# Patient Record
Sex: FEMALE | Race: BLACK OR AFRICAN AMERICAN | NOT HISPANIC OR LATINO
[De-identification: names, ages, dates, MRNs, and addresses within clinical notes are randomized per-mention and may not be internally consistent; named-entity substitution may affect disease eponyms.]

---

## 2017-09-19 PROBLEM — Z00.00 ENCOUNTER FOR PREVENTIVE HEALTH EXAMINATION: Status: ACTIVE | Noted: 2017-09-19

## 2017-09-28 ENCOUNTER — APPOINTMENT (OUTPATIENT)
Dept: ORTHOPEDIC SURGERY | Facility: CLINIC | Age: 62
End: 2017-09-28
Payer: COMMERCIAL

## 2017-09-28 VITALS
HEART RATE: 118 BPM | WEIGHT: 158 LBS | BODY MASS INDEX: 31.02 KG/M2 | SYSTOLIC BLOOD PRESSURE: 136 MMHG | DIASTOLIC BLOOD PRESSURE: 81 MMHG | HEIGHT: 60 IN

## 2017-09-28 PROCEDURE — 99214 OFFICE O/P EST MOD 30 MIN: CPT

## 2017-09-28 PROCEDURE — 73564 X-RAY EXAM KNEE 4 OR MORE: CPT | Mod: LT

## 2017-11-02 ENCOUNTER — APPOINTMENT (OUTPATIENT)
Dept: ORTHOPEDIC SURGERY | Facility: CLINIC | Age: 62
End: 2017-11-02
Payer: COMMERCIAL

## 2017-11-02 VITALS
WEIGHT: 158 LBS | SYSTOLIC BLOOD PRESSURE: 121 MMHG | BODY MASS INDEX: 31.02 KG/M2 | DIASTOLIC BLOOD PRESSURE: 81 MMHG | HEIGHT: 60 IN | HEART RATE: 123 BPM

## 2017-11-02 PROCEDURE — 99214 OFFICE O/P EST MOD 30 MIN: CPT

## 2018-01-15 ENCOUNTER — OUTPATIENT (OUTPATIENT)
Dept: OUTPATIENT SERVICES | Facility: HOSPITAL | Age: 63
LOS: 1 days | End: 2018-01-15
Payer: COMMERCIAL

## 2018-01-15 DIAGNOSIS — Z22.321 CARRIER OR SUSPECTED CARRIER OF METHICILLIN SUSCEPTIBLE STAPHYLOCOCCUS AUREUS: ICD-10-CM

## 2018-01-15 LAB
MRSA PCR RESULT.: NEGATIVE — SIGNIFICANT CHANGE UP
S AUREUS DNA NOSE QL NAA+PROBE: POSITIVE

## 2018-01-15 PROCEDURE — 87641 MR-STAPH DNA AMP PROBE: CPT

## 2018-01-16 RX ORDER — MUPIROCIN 20 MG/G
1 OINTMENT TOPICAL
Qty: 1 | Refills: 0
Start: 2018-01-16 | End: 2018-01-20

## 2018-01-25 ENCOUNTER — FORM ENCOUNTER (OUTPATIENT)
Age: 63
End: 2018-01-25

## 2018-01-25 VITALS
SYSTOLIC BLOOD PRESSURE: 152 MMHG | TEMPERATURE: 98 F | DIASTOLIC BLOOD PRESSURE: 70 MMHG | OXYGEN SATURATION: 98 % | HEIGHT: 59 IN | WEIGHT: 164.24 LBS | RESPIRATION RATE: 20 BRPM

## 2018-01-25 NOTE — PATIENT PROFILE ADULT. - PSH
H/O breast surgery  L  H/O colonoscopy  9/15  S/P left knee arthroscopy H/O breast surgery  L  H/O colonoscopy  9/15  S/P left knee arthroscopy  2002, 2007 Fallopian tube pregnancy  right  H/O breast surgery  L  H/O colonoscopy  9/15  H/O wisdom tooth extraction    History of arthroscopic knee surgery  bilateral at different times

## 2018-01-25 NOTE — PATIENT PROFILE ADULT. - PMH
Anal fissure    Anal fistula    Anxiety    HTN (hypertension)    Obesity    Osteoarthritis  b/l  Ovarian cyst Anal fissure    Anal fistula    Anxiety    HTN (hypertension)    Obesity    Osteoarthritis  b/l knee  Ovarian cyst

## 2018-01-26 ENCOUNTER — INPATIENT (INPATIENT)
Facility: HOSPITAL | Age: 63
LOS: 2 days | Discharge: HOME HEALTH | DRG: 470 | End: 2018-01-29
Attending: ORTHOPAEDIC SURGERY | Admitting: ORTHOPAEDIC SURGERY
Payer: COMMERCIAL

## 2018-01-26 ENCOUNTER — RESULT REVIEW (OUTPATIENT)
Age: 63
End: 2018-01-26

## 2018-01-26 ENCOUNTER — APPOINTMENT (OUTPATIENT)
Dept: ORTHOPEDIC SURGERY | Facility: HOSPITAL | Age: 63
End: 2018-01-26
Payer: COMMERCIAL

## 2018-01-26 DIAGNOSIS — Z98.890 OTHER SPECIFIED POSTPROCEDURAL STATES: Chronic | ICD-10-CM

## 2018-01-26 DIAGNOSIS — F41.9 ANXIETY DISORDER, UNSPECIFIED: ICD-10-CM

## 2018-01-26 DIAGNOSIS — O00.109 UNSPECIFIED TUBAL PREGNANCY WITHOUT INTRAUTERINE PREGNANCY: Chronic | ICD-10-CM

## 2018-01-26 DIAGNOSIS — K08.499 PARTIAL LOSS OF TEETH DUE TO OTHER SPECIFIED CAUSE, UNSPECIFIED CLASS: Chronic | ICD-10-CM

## 2018-01-26 DIAGNOSIS — I10 ESSENTIAL (PRIMARY) HYPERTENSION: ICD-10-CM

## 2018-01-26 DIAGNOSIS — M17.12 UNILATERAL PRIMARY OSTEOARTHRITIS, LEFT KNEE: ICD-10-CM

## 2018-01-26 DIAGNOSIS — Z90.79 ACQUIRED ABSENCE OF OTHER GENITAL ORGAN(S): Chronic | ICD-10-CM

## 2018-01-26 DIAGNOSIS — E66.01 MORBID (SEVERE) OBESITY DUE TO EXCESS CALORIES: ICD-10-CM

## 2018-01-26 LAB
HCT VFR BLD CALC: 34.6 % — SIGNIFICANT CHANGE UP (ref 34.5–45)
HGB BLD-MCNC: 10.8 G/DL — LOW (ref 11.5–15.5)
MCHC RBC-ENTMCNC: 26.2 PG — LOW (ref 27–34)
MCHC RBC-ENTMCNC: 31.2 G/DL — LOW (ref 32–36)
MCV RBC AUTO: 84 FL — SIGNIFICANT CHANGE UP (ref 80–100)
PLATELET # BLD AUTO: 250 K/UL — SIGNIFICANT CHANGE UP (ref 150–400)
RBC # BLD: 4.12 M/UL — SIGNIFICANT CHANGE UP (ref 3.8–5.2)
RBC # FLD: 13.1 % — SIGNIFICANT CHANGE UP (ref 10.3–16.9)
WBC # BLD: 3.7 K/UL — LOW (ref 3.8–10.5)
WBC # FLD AUTO: 3.7 K/UL — LOW (ref 3.8–10.5)

## 2018-01-26 PROCEDURE — 27447 TOTAL KNEE ARTHROPLASTY: CPT | Mod: LT

## 2018-01-26 PROCEDURE — ZZZZZ: CPT

## 2018-01-26 PROCEDURE — 73560 X-RAY EXAM OF KNEE 1 OR 2: CPT | Mod: 26,LT

## 2018-01-26 PROCEDURE — 20985 CPTR-ASST DIR MS PX: CPT

## 2018-01-26 RX ORDER — DOCUSATE SODIUM 100 MG
100 CAPSULE ORAL THREE TIMES A DAY
Refills: 0 | Status: DISCONTINUED | OUTPATIENT
Start: 2018-01-26 | End: 2018-01-29

## 2018-01-26 RX ORDER — HYDROMORPHONE HYDROCHLORIDE 2 MG/ML
4 INJECTION INTRAMUSCULAR; INTRAVENOUS; SUBCUTANEOUS EVERY 4 HOURS
Refills: 0 | Status: DISCONTINUED | OUTPATIENT
Start: 2018-01-26 | End: 2018-01-29

## 2018-01-26 RX ORDER — SENNA PLUS 8.6 MG/1
2 TABLET ORAL AT BEDTIME
Refills: 0 | Status: DISCONTINUED | OUTPATIENT
Start: 2018-01-26 | End: 2018-01-29

## 2018-01-26 RX ORDER — HYDROMORPHONE HYDROCHLORIDE 2 MG/ML
0.5 INJECTION INTRAMUSCULAR; INTRAVENOUS; SUBCUTANEOUS
Refills: 0 | Status: DISCONTINUED | OUTPATIENT
Start: 2018-01-26 | End: 2018-01-29

## 2018-01-26 RX ORDER — HYDROMORPHONE HYDROCHLORIDE 2 MG/ML
2 INJECTION INTRAMUSCULAR; INTRAVENOUS; SUBCUTANEOUS EVERY 4 HOURS
Refills: 0 | Status: DISCONTINUED | OUTPATIENT
Start: 2018-01-26 | End: 2018-01-29

## 2018-01-26 RX ORDER — OXYCODONE HYDROCHLORIDE 5 MG/1
20 TABLET ORAL ONCE
Refills: 0 | Status: DISCONTINUED | OUTPATIENT
Start: 2018-01-26 | End: 2018-01-26

## 2018-01-26 RX ORDER — ONDANSETRON 8 MG/1
4 TABLET, FILM COATED ORAL EVERY 6 HOURS
Refills: 0 | Status: DISCONTINUED | OUTPATIENT
Start: 2018-01-26 | End: 2018-01-29

## 2018-01-26 RX ORDER — PANTOPRAZOLE SODIUM 20 MG/1
40 TABLET, DELAYED RELEASE ORAL DAILY
Refills: 0 | Status: DISCONTINUED | OUTPATIENT
Start: 2018-01-26 | End: 2018-01-29

## 2018-01-26 RX ORDER — ACETAMINOPHEN 500 MG
975 TABLET ORAL EVERY 8 HOURS
Refills: 0 | Status: DISCONTINUED | OUTPATIENT
Start: 2018-01-26 | End: 2018-01-29

## 2018-01-26 RX ORDER — LISINOPRIL 2.5 MG/1
20 TABLET ORAL DAILY
Refills: 0 | Status: DISCONTINUED | OUTPATIENT
Start: 2018-01-26 | End: 2018-01-29

## 2018-01-26 RX ORDER — CEFAZOLIN SODIUM 1 G
2000 VIAL (EA) INJECTION EVERY 8 HOURS
Refills: 0 | Status: DISCONTINUED | OUTPATIENT
Start: 2018-01-26 | End: 2018-01-27

## 2018-01-26 RX ORDER — CELECOXIB 200 MG/1
400 CAPSULE ORAL ONCE
Refills: 0 | Status: COMPLETED | OUTPATIENT
Start: 2018-01-26 | End: 2018-01-26

## 2018-01-26 RX ORDER — KETOROLAC TROMETHAMINE 30 MG/ML
15 SYRINGE (ML) INJECTION EVERY 6 HOURS
Refills: 0 | Status: DISCONTINUED | OUTPATIENT
Start: 2018-01-26 | End: 2018-01-27

## 2018-01-26 RX ORDER — SODIUM CHLORIDE 9 MG/ML
1000 INJECTION, SOLUTION INTRAVENOUS
Refills: 0 | Status: DISCONTINUED | OUTPATIENT
Start: 2018-01-26 | End: 2018-01-29

## 2018-01-26 RX ORDER — POLYETHYLENE GLYCOL 3350 17 G/17G
17 POWDER, FOR SOLUTION ORAL DAILY
Refills: 0 | Status: DISCONTINUED | OUTPATIENT
Start: 2018-01-26 | End: 2018-01-29

## 2018-01-26 RX ORDER — HYDROCHLOROTHIAZIDE 25 MG
12.5 TABLET ORAL DAILY
Refills: 0 | Status: DISCONTINUED | OUTPATIENT
Start: 2018-01-26 | End: 2018-01-29

## 2018-01-26 RX ORDER — CEFAZOLIN SODIUM 1 G
2000 VIAL (EA) INJECTION EVERY 8 HOURS
Refills: 0 | Status: DISCONTINUED | OUTPATIENT
Start: 2018-01-26 | End: 2018-01-26

## 2018-01-26 RX ORDER — ASPIRIN/CALCIUM CARB/MAGNESIUM 324 MG
325 TABLET ORAL
Refills: 0 | Status: DISCONTINUED | OUTPATIENT
Start: 2018-01-26 | End: 2018-01-29

## 2018-01-26 RX ORDER — MAGNESIUM HYDROXIDE 400 MG/1
30 TABLET, CHEWABLE ORAL DAILY
Refills: 0 | Status: DISCONTINUED | OUTPATIENT
Start: 2018-01-26 | End: 2018-01-29

## 2018-01-26 RX ADMIN — Medication 2000 MILLIGRAM(S): at 23:17

## 2018-01-26 RX ADMIN — Medication 975 MILLIGRAM(S): at 23:17

## 2018-01-26 RX ADMIN — CELECOXIB 400 MILLIGRAM(S): 200 CAPSULE ORAL at 14:28

## 2018-01-26 RX ADMIN — Medication 25 MILLIGRAM(S): at 23:17

## 2018-01-26 RX ADMIN — HYDROMORPHONE HYDROCHLORIDE 0.5 MILLIGRAM(S): 2 INJECTION INTRAMUSCULAR; INTRAVENOUS; SUBCUTANEOUS at 23:32

## 2018-01-26 RX ADMIN — SODIUM CHLORIDE 80 MILLILITER(S): 9 INJECTION, SOLUTION INTRAVENOUS at 23:31

## 2018-01-26 RX ADMIN — Medication 15 MILLIGRAM(S): at 23:17

## 2018-01-26 RX ADMIN — Medication 100 MILLIGRAM(S): at 23:17

## 2018-01-26 RX ADMIN — OXYCODONE HYDROCHLORIDE 20 MILLIGRAM(S): 5 TABLET ORAL at 14:28

## 2018-01-26 RX ADMIN — HYDROMORPHONE HYDROCHLORIDE 0.5 MILLIGRAM(S): 2 INJECTION INTRAMUSCULAR; INTRAVENOUS; SUBCUTANEOUS at 20:58

## 2018-01-26 RX ADMIN — Medication 325 MILLIGRAM(S): at 23:36

## 2018-01-26 NOTE — H&P ADULT - HISTORY OF PRESENT ILLNESS
63 yo female here for chronic left knee pain worsened over time without improvement. Denies numbness, tingling. Ambulates without assist. Presents today for elective left TKA

## 2018-01-26 NOTE — BRIEF OPERATIVE NOTE - PROCEDURE
<<-----Click on this checkbox to enter Procedure Left total knee arthroplasty  01/26/2018    Active  JOSE

## 2018-01-26 NOTE — CONSULT NOTE ADULT - SUBJECTIVE AND OBJECTIVE BOX
HPI:  62 year old female with osteoarthritis left knee with moderate left knee pain and deformity, decreased ROM and unsteady gait.  X rays confirm diagnosis.  Symptoms worse with stairs and after prolonged imobility and persist over time    PAST MEDICAL & SURGICAL HISTORY:  Osteoarthritis: b/l knee  Anxiety  HTN (hypertension) allergic rhinitis  Obesity  Ovarian cyst  Anal fistula  Anal fissure  H/O wisdom tooth extraction  Fallopian tube pregnancy: right  History of arthroscopic knee surgery: bilateral at different times  H/O colonoscopy: 9/15  H/O breast surgery: L      REVIEW OF SYSTEMS    General:  malaise	  Skin/Breast: normal  Ophthalmologic: negative  ENMT:	normal  Respiratory and Thorax: normal  Cardiovascular:	normal  Gastrointestinal:	normal  Genitourinary:	normal  Musculoskeletal:	 left knee swelling   Neurological:	normal  Psychiatric:	normal  Hematology/Lymphatics:	 negative  Endocrine:	negative  Allergic/Immunologic:	negative      MEDICATIONS     mupirocin 2% topical ointment: Last Dose Taken: 25-Jan-2018 PM, Apply topically to both nostrils 2 times a day for 5 days   · 	lisinopril-hydroCHLOROthiazide 20 mg-12.5 mg oral tablet: Last Dose Taken: 26-Jan-2018 AM, 1 tab(s) orally once a day  · 	LORazepam 0.5 mg oral tablet: Last Dose Taken: 2017 , orally 2 times a day, As Needed  · 	diclofenac sodium 75 mg oral delayed release tablet: Last Dose Taken: 19-Jan-2018 , 1 tab(s) orally 2 times a day      Allergies    Bananas (Eye Irritation; Vomiting)  [place] (Eye Irritation; Vomiting)  No Known Drug Allergies    SOCIAL HISTORY: no cigs social alcohol    FAMILY HISTORY: non contributory      PHYSICAL EXAM:    Vital Signs Last 24 Hrs  T(C): --  T(F): --98.6  HR: --72  BP: --120/80  BP(mean): --  RR: --16  SpO2: --    Constitutional: WDWNF in NAD  Eyes: conj pale  ENMT: negative  Neck: supple  Breasts: not examined   Back: negative  Respiratory: clear to P&A  Cardiovascular: no MRGT or H  Gastrointestinal: normal bowel sounds  Genitourinary: neg  Rectal: not examined  Extremities: normal  Vascular: normal  Neurological: normal  Skin: negative  Lymph Nodes: negative  Musculoskeletal:   decreased ROM  left knee  Psychiatric: anxiety

## 2018-01-26 NOTE — CONSULT NOTE ADULT - PROBLEM SELECTOR PROBLEM 3
Class 3 severe obesity due to excess calories without serious comorbidity with body mass index (BMI) of 60.0 to 69.9 in adult

## 2018-01-26 NOTE — H&P ADULT - PSH
Fallopian tube pregnancy  right  H/O breast surgery  L  H/O colonoscopy  9/15  H/O wisdom tooth extraction    History of arthroscopic knee surgery  bilateral at different times

## 2018-01-26 NOTE — CONSULT NOTE ADULT - SUBJECTIVE AND OBJECTIVE BOX
Pain Management Consult Note - [facility] [phone]    Chief Complaint:  left knee pain    HPI:  61 y/o female with worsening left knee pain going for a left total knee replacement today.  Pt. states she was taking tylenol prn at home for pain.  States she had oxycodone after a previous surgery and stated it didn't help control the pain.        Pain is _x__ sharp ____dull ___burning ___achy ___ Intensity: ____ mild ___mod ___severe     Location ____surgical site ____cervical _____lumbar ____abd ____upper ext___x_lower ext    Worse with __x__activity _x___movement _____physical therapy___ Rest    Improved with __x__medication __x__rest ____physical therapy    ROS: Const:  _-__febrile   Eyes:___ENT:_-__CV: _-__chest pain  Resp: _-___sob  GI:_-__nausea _-__vomiting _-__abd pain _+__npo ___clears __full diet __bm  :_-__ Musk: _+__pain ___spasm  Skin:___ Neuro:  _-__zvutyyic_-__bnxurszbm__-_ numbness _-__weakness ___paresth  Psych:__anxiety  Endo:__-_ Heme:__-_Allergy:_NKDA________, ___all others reviewed and negative    PAST MEDICAL & SURGICAL HISTORY:  Osteoarthritis: b/l knee  Anxiety  HTN (hypertension)  Obesity  Ovarian cyst  Anal fistula  Anal fissure  H/O wisdom tooth extraction  Fallopian tube pregnancy: right  History of arthroscopic knee surgery: bilateral at different times  H/O colonoscopy: 9/15  H/O breast surgery: L      SH: _denies__Tobacco   _denies__Alcohol                          FH:FAMILY HISTORY:  father-HTN          T(C): --  HR: --  BP: --  RR: --  SpO2: --  Wt(kg): --    T(C): --  HR: --  BP: --  RR: --  SpO2: --  Wt(kg): --    T(C): --  HR: --  BP: --  RR: --  SpO2: --  Wt(kg): --    PHYSICAL EXAM:  Gen Appearance: _x__no acute distress _x__appropriate        Neuro: _x__SILT feet__x__ EOM Intact Psych: AAOX3__, _x__mood/affect appropriate        Eyes: _x__conjunctiva WNL  ___x__ Pupils equal and round        ENT: _x__ears and nose atraumatic__x_ Hearing grossly intact        Neck: ___trachea midline, no visible masses ___thyroid without palpable mass    Resp: __x_Nml WOB____No tactile fremitus ___clear to auscultation    Cardio: ___extremities free from edema ____pedal pulses palpable    GI/Abdomen: __x_soft ___x__ Nontender______Nondistended_____HSM    Lymphatic: ___no palpable nodes in neck  ___no palpable nodes calves and feet    Skin/Wound: ___Incision, ___Dressing c/d/i,   ____surrounding tissues soft,  ___drain/chest tube present____    Muscular: EHL __5_/5  Gastrocnemius___/5    _x__absent clubbing/cyanosis      ASSESSMENT: This is a 62y old Female with a history of   Osteoarthritis  Anxiety  HTN (hypertension)  Obesity  Ovarian cyst  Anal fistula  Anal fissure  H/O wisdom tooth extraction  Fallopian tube pregnancy  History of unilateral fallopian tube excision  History of arthroscopic knee surgery  S/P left knee arthroscopy  H/O colonoscopy  H/O breast surgery  S/P left knee arthroscopy  and worsening left knee pain going for a left total knee replacement today.    Recommended Treatment PLAN:  1.  Dilaudid 2-4 mg po q4h prn  2.  Dilaudid 0.5 mg IV q2h prn  3.  Tylenol 975 mg po q8h  4.  Toradol 15 mg IV q6h x4  5.  Lyrica 25 mg po TID

## 2018-01-26 NOTE — H&P ADULT - PMH
Anal fissure    Anal fistula    Anxiety    HTN (hypertension)    Obesity    Osteoarthritis  b/l knee  Ovarian cyst

## 2018-01-26 NOTE — H&P ADULT - NSHPPHYSICALEXAM_GEN_ALL_CORE
left LE: Decreased ROM secondary to pain, sensation intact, DP 2+, brisk cap refill, EHL/FHL/TA/GS 5/5  Rest of PE per MD clearance

## 2018-01-26 NOTE — PROGRESS NOTE ADULT - SUBJECTIVE AND OBJECTIVE BOX
Fellow Note POC    Patient seen and examined at bedside.      S: No acute events post op. pain controlled with spinal   Denies CP/SOB. Tolerating PO.  ROS unremarkable.    O: T(C): --  HR: --  BP: --  RR: --  SpO2: --  Wt(kg): --    NAD, AOx3, non-labored respirations  LLE: Dressing CDI  Extremity soft, appropriate swelling and minimally TTP  foot warm and well perfused  motor and sensation decreased 2/2 spinal       I&O's Detail        A/P: 62y Female s/p L TKA POD #0     - analgesia with bowel regimen  - WBAT with PT  - OOB ad edmund  - DVT ppx: ASA   - ADAT  - f/u labs  - Dispo planning

## 2018-01-27 DIAGNOSIS — D50.0 IRON DEFICIENCY ANEMIA SECONDARY TO BLOOD LOSS (CHRONIC): ICD-10-CM

## 2018-01-27 LAB
ANION GAP SERPL CALC-SCNC: 10 MMOL/L — SIGNIFICANT CHANGE UP (ref 5–17)
BUN SERPL-MCNC: 15 MG/DL — SIGNIFICANT CHANGE UP (ref 7–23)
CALCIUM SERPL-MCNC: 8.8 MG/DL — SIGNIFICANT CHANGE UP (ref 8.4–10.5)
CHLORIDE SERPL-SCNC: 97 MMOL/L — SIGNIFICANT CHANGE UP (ref 96–108)
CO2 SERPL-SCNC: 29 MMOL/L — SIGNIFICANT CHANGE UP (ref 22–31)
CREAT SERPL-MCNC: 0.89 MG/DL — SIGNIFICANT CHANGE UP (ref 0.5–1.3)
GLUCOSE SERPL-MCNC: 107 MG/DL — HIGH (ref 70–99)
HCT VFR BLD CALC: 31.9 % — LOW (ref 34.5–45)
HGB BLD-MCNC: 10.1 G/DL — LOW (ref 11.5–15.5)
MCHC RBC-ENTMCNC: 26.7 PG — LOW (ref 27–34)
MCHC RBC-ENTMCNC: 31.7 G/DL — LOW (ref 32–36)
MCV RBC AUTO: 84.4 FL — SIGNIFICANT CHANGE UP (ref 80–100)
PLATELET # BLD AUTO: 223 K/UL — SIGNIFICANT CHANGE UP (ref 150–400)
POTASSIUM SERPL-MCNC: 3.5 MMOL/L — SIGNIFICANT CHANGE UP (ref 3.5–5.3)
POTASSIUM SERPL-SCNC: 3.5 MMOL/L — SIGNIFICANT CHANGE UP (ref 3.5–5.3)
RBC # BLD: 3.78 M/UL — LOW (ref 3.8–5.2)
RBC # FLD: 13 % — SIGNIFICANT CHANGE UP (ref 10.3–16.9)
SODIUM SERPL-SCNC: 136 MMOL/L — SIGNIFICANT CHANGE UP (ref 135–145)
WBC # BLD: 5.5 K/UL — SIGNIFICANT CHANGE UP (ref 3.8–10.5)
WBC # FLD AUTO: 5.5 K/UL — SIGNIFICANT CHANGE UP (ref 3.8–10.5)

## 2018-01-27 RX ADMIN — Medication 975 MILLIGRAM(S): at 06:23

## 2018-01-27 RX ADMIN — HYDROMORPHONE HYDROCHLORIDE 4 MILLIGRAM(S): 2 INJECTION INTRAMUSCULAR; INTRAVENOUS; SUBCUTANEOUS at 09:15

## 2018-01-27 RX ADMIN — Medication 25 MILLIGRAM(S): at 13:55

## 2018-01-27 RX ADMIN — Medication 15 MILLIGRAM(S): at 06:22

## 2018-01-27 RX ADMIN — Medication 100 MILLIGRAM(S): at 06:23

## 2018-01-27 RX ADMIN — Medication 975 MILLIGRAM(S): at 13:55

## 2018-01-27 RX ADMIN — PANTOPRAZOLE SODIUM 40 MILLIGRAM(S): 20 TABLET, DELAYED RELEASE ORAL at 08:59

## 2018-01-27 RX ADMIN — ONDANSETRON 4 MILLIGRAM(S): 8 TABLET, FILM COATED ORAL at 21:55

## 2018-01-27 RX ADMIN — Medication 325 MILLIGRAM(S): at 17:51

## 2018-01-27 RX ADMIN — Medication 15 MILLIGRAM(S): at 13:12

## 2018-01-27 RX ADMIN — Medication 15 MILLIGRAM(S): at 00:40

## 2018-01-27 RX ADMIN — Medication 100 MILLIGRAM(S): at 13:55

## 2018-01-27 RX ADMIN — Medication 2000 MILLIGRAM(S): at 06:40

## 2018-01-27 RX ADMIN — POLYETHYLENE GLYCOL 3350 17 GRAM(S): 17 POWDER, FOR SOLUTION ORAL at 08:59

## 2018-01-27 RX ADMIN — HYDROMORPHONE HYDROCHLORIDE 4 MILLIGRAM(S): 2 INJECTION INTRAMUSCULAR; INTRAVENOUS; SUBCUTANEOUS at 10:00

## 2018-01-27 RX ADMIN — Medication 975 MILLIGRAM(S): at 21:40

## 2018-01-27 RX ADMIN — ONDANSETRON 4 MILLIGRAM(S): 8 TABLET, FILM COATED ORAL at 14:34

## 2018-01-27 RX ADMIN — Medication 2000 MILLIGRAM(S): at 21:41

## 2018-01-27 RX ADMIN — HYDROMORPHONE HYDROCHLORIDE 2 MILLIGRAM(S): 2 INJECTION INTRAMUSCULAR; INTRAVENOUS; SUBCUTANEOUS at 22:40

## 2018-01-27 RX ADMIN — Medication 325 MILLIGRAM(S): at 06:29

## 2018-01-27 RX ADMIN — Medication 15 MILLIGRAM(S): at 08:26

## 2018-01-27 RX ADMIN — HYDROMORPHONE HYDROCHLORIDE 4 MILLIGRAM(S): 2 INJECTION INTRAMUSCULAR; INTRAVENOUS; SUBCUTANEOUS at 19:01

## 2018-01-27 RX ADMIN — Medication 100 MILLIGRAM(S): at 21:40

## 2018-01-27 RX ADMIN — Medication 12.5 MILLIGRAM(S): at 06:22

## 2018-01-27 RX ADMIN — Medication 2000 MILLIGRAM(S): at 13:55

## 2018-01-27 RX ADMIN — Medication 25 MILLIGRAM(S): at 06:23

## 2018-01-27 RX ADMIN — Medication 15 MILLIGRAM(S): at 13:25

## 2018-01-27 RX ADMIN — HYDROMORPHONE HYDROCHLORIDE 4 MILLIGRAM(S): 2 INJECTION INTRAMUSCULAR; INTRAVENOUS; SUBCUTANEOUS at 19:45

## 2018-01-27 RX ADMIN — Medication 25 MILLIGRAM(S): at 21:40

## 2018-01-27 RX ADMIN — HYDROMORPHONE HYDROCHLORIDE 4 MILLIGRAM(S): 2 INJECTION INTRAMUSCULAR; INTRAVENOUS; SUBCUTANEOUS at 23:48

## 2018-01-27 RX ADMIN — Medication 1 TABLET(S): at 08:59

## 2018-01-27 RX ADMIN — LISINOPRIL 20 MILLIGRAM(S): 2.5 TABLET ORAL at 06:23

## 2018-01-27 RX ADMIN — HYDROMORPHONE HYDROCHLORIDE 2 MILLIGRAM(S): 2 INJECTION INTRAMUSCULAR; INTRAVENOUS; SUBCUTANEOUS at 21:40

## 2018-01-27 NOTE — PROGRESS NOTE ADULT - SUBJECTIVE AND OBJECTIVE BOX
HPI:  62 year old female with osteoarthritis left knee with moderate left knee pain and deformity, decreased ROM and unsteady gait.  X rays confirm diagnosis.  Symptoms worse with stairs and after prolonged imobility and persist over time.  Patient day #1 post op left TKR with moderate left knee pain and malaise.    PAST MEDICAL & SURGICAL HISTORY:  Osteoarthritis: b/l knee  Anxiety  HTN (hypertension)  Obesity  Ovarian cyst  Anal fistula  Anal fissure  H/O wisdom tooth extraction  Fallopian tube pregnancy: right  History of arthroscopic knee surgery: bilateral at different times  H/O colonoscopy: 9/15  H/O breast surgery: L      MEDICATIONS  (STANDING):  acetaminophen   Tablet 975 milliGRAM(s) Oral every 8 hours  aspirin enteric coated 325 milliGRAM(s) Oral two times a day  ceFAZolin  Injectable. 2000 milliGRAM(s) IV Push every 8 hours  docusate sodium 100 milliGRAM(s) Oral three times a day  hydrochlorothiazide 12.5 milliGRAM(s) Oral daily  ketorolac   Injectable 15 milliGRAM(s) IV Push every 6 hours  lactated ringers. 1000 milliLiter(s) (80 mL/Hr) IV Continuous <Continuous>  lisinopril 20 milliGRAM(s) Oral daily  multivitamin 1 Tablet(s) Oral daily  pantoprazole    Tablet 40 milliGRAM(s) Oral daily  polyethylene glycol 3350 17 Gram(s) Oral daily  pregabalin 25 milliGRAM(s) Oral three times a day    MEDICATIONS  (PRN):  aluminum hydroxide/magnesium hydroxide/simethicone Suspension 30 milliLiter(s) Oral four times a day PRN Indigestion  HYDROmorphone   Tablet 2 milliGRAM(s) Oral every 4 hours PRN Moderate Pain (4 - 6)  HYDROmorphone   Tablet 4 milliGRAM(s) Oral every 4 hours PRN Severe Pain (7 - 10)  HYDROmorphone  Injectable 0.5 milliGRAM(s) IV Push every 2 hours PRN breakthrough pain  HYDROmorphone  Injectable 0.5 milliGRAM(s) IV Push every 10 minutes PRN Severe Pain (7 - 10)  LORazepam     Tablet 0.5 milliGRAM(s) Oral two times a day PRN Anxiety  magnesium hydroxide Suspension 30 milliLiter(s) Oral daily PRN Constipation  ondansetron Injectable 4 milliGRAM(s) IV Push every 6 hours PRN Nausea and/or Vomiting  senna 2 Tablet(s) Oral at bedtime PRN Constipation      Allergies    Bananas (Eye Irritation; Vomiting)  [place] (Eye Irritation; Vomiting)  No Known Drug Allergies    REVIEW OF SYSTEMS    General:  malaise	  Skin/Breast: normal  Ophthalmologic: negative  ENMT:	normal  Respiratory and Thorax: normal  Cardiovascular:	normal  Gastrointestinal:	normal  Genitourinary:	normal  Musculoskeletal:	 left knee swelling   Neurological:	normal  Psychiatric:	normal  Hematology/Lymphatics:	 negative  Endocrine:	negative  Allergic/Immunologic:	negative      PHYSICAL EXAM:    Vital Signs Last 24 Hrs  T(C): 36.8 (27 Jan 2018 01:08), Max: 36.8 (27 Jan 2018 01:08)  T(F): 98.2 (27 Jan 2018 01:08), Max: 98.2 (27 Jan 2018 01:08)  HR: 96 (27 Jan 2018 01:08) (66 - 96)  BP: 113/62 (27 Jan 2018 01:08) (98/57 - 119/66)  BP(mean): 80 (26 Jan 2018 20:36) (73 - 89)  RR: 16 (27 Jan 2018 01:08) (9 - 20)  SpO2: 100% (27 Jan 2018 01:08) (92% - 100%)    Constitutional: WDWNF    Eyes: conj pale  ENMT: negative  Neck: supple  Breasts: not examined   Back: negative  Respiratory: clear to P&A  Cardiovascular: no MRGT or H  Gastrointestinal: normal bowel sounds  Genitourinary: neg  Rectal: not examined  Extremities:  normal  Vascular: normal  Neurological: normal  Skin: negative  Lymph Nodes: negative  Musculoskeletal:   decreased ROM  left knee  Psychiatric: anxiety                        10.1   5.5   )-----------( 223      ( 27 Jan 2018 06:14 )             31.9       01-27    136  |  97  |  15  ----------------------------<  107<H>  3.5   |  29  |  0.89    Ca    8.8      27 Jan 2018 06:14

## 2018-01-27 NOTE — PHYSICAL THERAPY INITIAL EVALUATION ADULT - ADDITIONAL COMMENTS
Pt lives in house, 5 stairs to enter withy ledge to lean on, 10 stairs with one handrail to full bath. Pt was previously ambulating ~ 2-3 blocks without AD.

## 2018-01-27 NOTE — PROGRESS NOTE ADULT - SUBJECTIVE AND OBJECTIVE BOX
POST OPERATIVE DAY #: 1   STATUS POST:  Left  TKA                      SUBJECTIVE: Patient seen and examined. Doing well. Pain under control. No chest pain, no SOB       Pain Management:   Pain Controlled Analgesia       OBJECTIVE:     Vital Signs Last 24 Hrs  T(C): 36.6 (27 Jan 2018 09:03), Max: 36.8 (27 Jan 2018 01:08)  T(F): 97.9 (27 Jan 2018 09:03), Max: 98.2 (27 Jan 2018 01:08)  HR: 104 (27 Jan 2018 09:03) (66 - 104)  BP: 123/72 (27 Jan 2018 09:03) (98/57 - 123/72)  BP(mean): 80 (26 Jan 2018 20:36) (73 - 89)  RR: 16 (27 Jan 2018 09:03) (9 - 20)  SpO2: 100% (27 Jan 2018 09:03) (92% - 100%)    Affected extremity:          Dressing:  clean/dry/intact             Sensation:  intact to light touch           Motor exam: 5/ 5 Tibialis Anterior/Gastrocnemius-Soleus           warm well perfused; capillary refill <3 seconds     LABS:                        10.1   5.5   )-----------( 223      ( 27 Jan 2018 06:14 )             31.9     01-27    136  |  97  |  15  ----------------------------<  107<H>  3.5   |  29  |  0.89    Ca    8.8      27 Jan 2018 06:14            MEDICATIONS:acetaminophen   Tablet 975 milliGRAM(s) Oral every 8 hours  aluminum hydroxide/magnesium hydroxide/simethicone Suspension 30 milliLiter(s) Oral four times a day PRN  aspirin enteric coated 325 milliGRAM(s) Oral two times a day  ceFAZolin  Injectable. 2000 milliGRAM(s) IV Push every 8 hours  docusate sodium 100 milliGRAM(s) Oral three times a day  hydrochlorothiazide 12.5 milliGRAM(s) Oral daily  HYDROmorphone   Tablet 2 milliGRAM(s) Oral every 4 hours PRN  HYDROmorphone   Tablet 4 milliGRAM(s) Oral every 4 hours PRN  HYDROmorphone  Injectable 0.5 milliGRAM(s) IV Push every 2 hours PRN  HYDROmorphone  Injectable 0.5 milliGRAM(s) IV Push every 10 minutes PRN  ketorolac   Injectable 15 milliGRAM(s) IV Push every 6 hours  lactated ringers. 1000 milliLiter(s) IV Continuous <Continuous>  lisinopril 20 milliGRAM(s) Oral daily  LORazepam     Tablet 0.5 milliGRAM(s) Oral two times a day PRN  magnesium hydroxide Suspension 30 milliLiter(s) Oral daily PRN  multivitamin 1 Tablet(s) Oral daily  ondansetron Injectable 4 milliGRAM(s) IV Push every 6 hours PRN  pantoprazole    Tablet 40 milliGRAM(s) Oral daily  polyethylene glycol 3350 17 Gram(s) Oral daily  pregabalin 25 milliGRAM(s) Oral three times a day  senna 2 Tablet(s) Oral at bedtime PRN    Anticoagulation:  aspirin enteric coated 325 milliGRAM(s) Oral two times a day      Antibiotics:   ceFAZolin  Injectable. 2000 milliGRAM(s) IV Push every 8 hours      Pain medications:   acetaminophen   Tablet 975 milliGRAM(s) Oral every 8 hours  HYDROmorphone   Tablet 2 milliGRAM(s) Oral every 4 hours PRN  HYDROmorphone   Tablet 4 milliGRAM(s) Oral every 4 hours PRN  HYDROmorphone  Injectable 0.5 milliGRAM(s) IV Push every 2 hours PRN  HYDROmorphone  Injectable 0.5 milliGRAM(s) IV Push every 10 minutes PRN  ketorolac   Injectable 15 milliGRAM(s) IV Push every 6 hours  LORazepam     Tablet 0.5 milliGRAM(s) Oral two times a day PRN  ondansetron Injectable 4 milliGRAM(s) IV Push every 6 hours PRN  pregabalin 25 milliGRAM(s) Oral three times a day      RADIOLOGY & ADDITIONAL STUDIES:    A/P :   s/p  Left   TKA   POD # 1  -    Pain control  -    DVT ppx:  [id number]   -    Periop abx:  Ancef    -    ADAT  -    Check AM labs  -    Weight bearing status: WBAT    -    Resume home meds  -    Physical Therapy  -    Dispo:   To be determined

## 2018-01-27 NOTE — PHYSICAL THERAPY INITIAL EVALUATION ADULT - PERTINENT HX OF CURRENT PROBLEM, REHAB EVAL
As per chart: 63 yo female here for chronic left knee pain worsened over time without improvement. Denies numbness, tingling. Ambulates without assist. OA. tricompartmental DJD.

## 2018-01-27 NOTE — PHYSICAL THERAPY INITIAL EVALUATION ADULT - GENERAL OBSERVATIONS, REHAB EVAL
Pt received supine, HOB elevated, RUE IV, BLE SCDs, surgical dressing on left knee clean, dry and intact pre and post treatment with ace wrap and cryocuff, NAD.

## 2018-01-27 NOTE — PHYSICAL THERAPY INITIAL EVALUATION ADULT - IMPAIRED TRANSFERS: SIT/STAND, REHAB EVAL
decreased strength/impaired balance/pain/pt reported 7/10 pain, RN Cristo aware. decreased WBing on LLE

## 2018-01-28 LAB
ANION GAP SERPL CALC-SCNC: 11 MMOL/L — SIGNIFICANT CHANGE UP (ref 5–17)
BUN SERPL-MCNC: 17 MG/DL — SIGNIFICANT CHANGE UP (ref 7–23)
CALCIUM SERPL-MCNC: 8.9 MG/DL — SIGNIFICANT CHANGE UP (ref 8.4–10.5)
CHLORIDE SERPL-SCNC: 100 MMOL/L — SIGNIFICANT CHANGE UP (ref 96–108)
CO2 SERPL-SCNC: 29 MMOL/L — SIGNIFICANT CHANGE UP (ref 22–31)
CREAT SERPL-MCNC: 1.27 MG/DL — SIGNIFICANT CHANGE UP (ref 0.5–1.3)
GLUCOSE SERPL-MCNC: 119 MG/DL — HIGH (ref 70–99)
HCT VFR BLD CALC: 31.2 % — LOW (ref 34.5–45)
HGB BLD-MCNC: 9.9 G/DL — LOW (ref 11.5–15.5)
MCHC RBC-ENTMCNC: 26.8 PG — LOW (ref 27–34)
MCHC RBC-ENTMCNC: 31.7 G/DL — LOW (ref 32–36)
MCV RBC AUTO: 84.3 FL — SIGNIFICANT CHANGE UP (ref 80–100)
PLATELET # BLD AUTO: 213 K/UL — SIGNIFICANT CHANGE UP (ref 150–400)
POTASSIUM SERPL-MCNC: 4 MMOL/L — SIGNIFICANT CHANGE UP (ref 3.5–5.3)
POTASSIUM SERPL-SCNC: 4 MMOL/L — SIGNIFICANT CHANGE UP (ref 3.5–5.3)
RBC # BLD: 3.7 M/UL — LOW (ref 3.8–5.2)
RBC # FLD: 13.5 % — SIGNIFICANT CHANGE UP (ref 10.3–16.9)
SODIUM SERPL-SCNC: 140 MMOL/L — SIGNIFICANT CHANGE UP (ref 135–145)
WBC # BLD: 6.9 K/UL — SIGNIFICANT CHANGE UP (ref 3.8–10.5)
WBC # FLD AUTO: 6.9 K/UL — SIGNIFICANT CHANGE UP (ref 3.8–10.5)

## 2018-01-28 RX ADMIN — Medication 100 MILLIGRAM(S): at 21:42

## 2018-01-28 RX ADMIN — Medication 10 MILLIGRAM(S): at 19:25

## 2018-01-28 RX ADMIN — HYDROMORPHONE HYDROCHLORIDE 4 MILLIGRAM(S): 2 INJECTION INTRAMUSCULAR; INTRAVENOUS; SUBCUTANEOUS at 17:02

## 2018-01-28 RX ADMIN — Medication 25 MILLIGRAM(S): at 06:29

## 2018-01-28 RX ADMIN — ONDANSETRON 4 MILLIGRAM(S): 8 TABLET, FILM COATED ORAL at 09:31

## 2018-01-28 RX ADMIN — HYDROMORPHONE HYDROCHLORIDE 4 MILLIGRAM(S): 2 INJECTION INTRAMUSCULAR; INTRAVENOUS; SUBCUTANEOUS at 18:00

## 2018-01-28 RX ADMIN — MAGNESIUM HYDROXIDE 30 MILLILITER(S): 400 TABLET, CHEWABLE ORAL at 09:31

## 2018-01-28 RX ADMIN — Medication 100 MILLIGRAM(S): at 14:07

## 2018-01-28 RX ADMIN — Medication 325 MILLIGRAM(S): at 17:02

## 2018-01-28 RX ADMIN — SENNA PLUS 2 TABLET(S): 8.6 TABLET ORAL at 21:42

## 2018-01-28 RX ADMIN — PANTOPRAZOLE SODIUM 40 MILLIGRAM(S): 20 TABLET, DELAYED RELEASE ORAL at 09:27

## 2018-01-28 RX ADMIN — Medication 325 MILLIGRAM(S): at 06:29

## 2018-01-28 RX ADMIN — Medication 1 TABLET(S): at 09:27

## 2018-01-28 RX ADMIN — Medication 12.5 MILLIGRAM(S): at 06:29

## 2018-01-28 RX ADMIN — Medication 975 MILLIGRAM(S): at 14:07

## 2018-01-28 RX ADMIN — Medication 975 MILLIGRAM(S): at 21:42

## 2018-01-28 RX ADMIN — Medication 25 MILLIGRAM(S): at 14:07

## 2018-01-28 RX ADMIN — Medication 100 MILLIGRAM(S): at 06:29

## 2018-01-28 RX ADMIN — LISINOPRIL 20 MILLIGRAM(S): 2.5 TABLET ORAL at 06:29

## 2018-01-28 RX ADMIN — Medication 975 MILLIGRAM(S): at 06:29

## 2018-01-28 RX ADMIN — HYDROMORPHONE HYDROCHLORIDE 4 MILLIGRAM(S): 2 INJECTION INTRAMUSCULAR; INTRAVENOUS; SUBCUTANEOUS at 06:48

## 2018-01-28 RX ADMIN — HYDROMORPHONE HYDROCHLORIDE 4 MILLIGRAM(S): 2 INJECTION INTRAMUSCULAR; INTRAVENOUS; SUBCUTANEOUS at 05:48

## 2018-01-28 RX ADMIN — POLYETHYLENE GLYCOL 3350 17 GRAM(S): 17 POWDER, FOR SOLUTION ORAL at 09:27

## 2018-01-28 RX ADMIN — HYDROMORPHONE HYDROCHLORIDE 4 MILLIGRAM(S): 2 INJECTION INTRAMUSCULAR; INTRAVENOUS; SUBCUTANEOUS at 00:48

## 2018-01-28 RX ADMIN — Medication 25 MILLIGRAM(S): at 21:42

## 2018-01-28 NOTE — PROGRESS NOTE ADULT - SUBJECTIVE AND OBJECTIVE BOX
POST OPERATIVE DAY #:  2   STATUS POST:  Left    TKA                      SUBJECTIVE: Patient seen and examined. Doing well. No chest pain. No SOB, no calf pain       Pain Management:   Pain Controlled Analgesia        OBJECTIVE:     Vital Signs Last 24 Hrs  T(C): 36.9 (28 Jan 2018 09:10), Max: 37.1 (27 Jan 2018 21:35)  T(F): 98.5 (28 Jan 2018 09:10), Max: 98.8 (27 Jan 2018 21:35)  HR: 87 (28 Jan 2018 10:35) (87 - 116)  BP: 93/65 (28 Jan 2018 10:35) (93/65 - 122/69)  BP(mean): --  RR: 17 (28 Jan 2018 09:10) (16 - 18)  SpO2: 97% (28 Jan 2018 10:35) (97% - 100%)    Affected extremity:          Dressing:   clean/dry/intact           Sensation:   intact to light touch            Motor exam: 5/ 5 Tibialis Anterior/Gastrocnemius-Soleus           warm well perfused; capillary refill <3 seconds     LABS:                        9.9    6.9   )-----------( 213      ( 28 Jan 2018 05:54 )             31.2     01-28    140  |  100  |  17  ----------------------------<  119<H>  4.0   |  29  |  1.27    Ca    8.9      28 Jan 2018 05:53            MEDICATIONS:acetaminophen   Tablet 975 milliGRAM(s) Oral every 8 hours  aluminum hydroxide/magnesium hydroxide/simethicone Suspension 30 milliLiter(s) Oral four times a day PRN  aspirin enteric coated 325 milliGRAM(s) Oral two times a day  bisacodyl Suppository 10 milliGRAM(s) Rectal daily PRN  docusate sodium 100 milliGRAM(s) Oral three times a day  hydrochlorothiazide 12.5 milliGRAM(s) Oral daily  HYDROmorphone   Tablet 2 milliGRAM(s) Oral every 4 hours PRN  HYDROmorphone   Tablet 4 milliGRAM(s) Oral every 4 hours PRN  HYDROmorphone  Injectable 0.5 milliGRAM(s) IV Push every 2 hours PRN  HYDROmorphone  Injectable 0.5 milliGRAM(s) IV Push every 10 minutes PRN  lactated ringers. 1000 milliLiter(s) IV Continuous <Continuous>  lisinopril 20 milliGRAM(s) Oral daily  LORazepam     Tablet 0.5 milliGRAM(s) Oral two times a day PRN  magnesium hydroxide Suspension 30 milliLiter(s) Oral daily PRN  multivitamin 1 Tablet(s) Oral daily  ondansetron Injectable 4 milliGRAM(s) IV Push every 6 hours PRN  pantoprazole    Tablet 40 milliGRAM(s) Oral daily  polyethylene glycol 3350 17 Gram(s) Oral daily  pregabalin 25 milliGRAM(s) Oral three times a day  senna 2 Tablet(s) Oral at bedtime PRN    Anticoagulation:  aspirin enteric coated 325 milliGRAM(s) Oral two times a day      Antibiotics:       Pain medications:   acetaminophen   Tablet 975 milliGRAM(s) Oral every 8 hours  HYDROmorphone   Tablet 2 milliGRAM(s) Oral every 4 hours PRN  HYDROmorphone   Tablet 4 milliGRAM(s) Oral every 4 hours PRN  HYDROmorphone  Injectable 0.5 milliGRAM(s) IV Push every 2 hours PRN  HYDROmorphone  Injectable 0.5 milliGRAM(s) IV Push every 10 minutes PRN  LORazepam     Tablet 0.5 milliGRAM(s) Oral two times a day PRN  ondansetron Injectable 4 milliGRAM(s) IV Push every 6 hours PRN  pregabalin 25 milliGRAM(s) Oral three times a day      RADIOLOGY & ADDITIONAL STUDIES:    A/P :   s/p  Left TKA  POD # 2  -    Pain control  -    DVT ppx:  [id number]     -    ADAT  -    Check AM labs  -    Weight bearing status: WBAT    -    Resume home meds  -    Physical Therapy  -    Dressing changed to [employer]  -    Dispo: Home possibly monday

## 2018-01-29 VITALS
TEMPERATURE: 98 F | HEART RATE: 97 BPM | SYSTOLIC BLOOD PRESSURE: 119 MMHG | OXYGEN SATURATION: 100 % | DIASTOLIC BLOOD PRESSURE: 70 MMHG | RESPIRATION RATE: 17 BRPM

## 2018-01-29 LAB
ANION GAP SERPL CALC-SCNC: 9 MMOL/L — SIGNIFICANT CHANGE UP (ref 5–17)
BUN SERPL-MCNC: 13 MG/DL — SIGNIFICANT CHANGE UP (ref 7–23)
CALCIUM SERPL-MCNC: 9.2 MG/DL — SIGNIFICANT CHANGE UP (ref 8.4–10.5)
CHLORIDE SERPL-SCNC: 98 MMOL/L — SIGNIFICANT CHANGE UP (ref 96–108)
CO2 SERPL-SCNC: 33 MMOL/L — HIGH (ref 22–31)
CREAT SERPL-MCNC: 0.9 MG/DL — SIGNIFICANT CHANGE UP (ref 0.5–1.3)
GLUCOSE SERPL-MCNC: 123 MG/DL — HIGH (ref 70–99)
HCT VFR BLD CALC: 29.8 % — LOW (ref 34.5–45)
HGB BLD-MCNC: 9.3 G/DL — LOW (ref 11.5–15.5)
MCHC RBC-ENTMCNC: 26.2 PG — LOW (ref 27–34)
MCHC RBC-ENTMCNC: 31.2 G/DL — LOW (ref 32–36)
MCV RBC AUTO: 83.9 FL — SIGNIFICANT CHANGE UP (ref 80–100)
PLATELET # BLD AUTO: 218 K/UL — SIGNIFICANT CHANGE UP (ref 150–400)
POTASSIUM SERPL-MCNC: 4.2 MMOL/L — SIGNIFICANT CHANGE UP (ref 3.5–5.3)
POTASSIUM SERPL-SCNC: 4.2 MMOL/L — SIGNIFICANT CHANGE UP (ref 3.5–5.3)
RBC # BLD: 3.55 M/UL — LOW (ref 3.8–5.2)
RBC # FLD: 13.2 % — SIGNIFICANT CHANGE UP (ref 10.3–16.9)
SODIUM SERPL-SCNC: 140 MMOL/L — SIGNIFICANT CHANGE UP (ref 135–145)
SURGICAL PATHOLOGY STUDY: SIGNIFICANT CHANGE UP
WBC # BLD: 7.8 K/UL — SIGNIFICANT CHANGE UP (ref 3.8–10.5)
WBC # FLD AUTO: 7.8 K/UL — SIGNIFICANT CHANGE UP (ref 3.8–10.5)

## 2018-01-29 RX ORDER — HYDROMORPHONE HYDROCHLORIDE 2 MG/ML
1 INJECTION INTRAMUSCULAR; INTRAVENOUS; SUBCUTANEOUS
Qty: 42 | Refills: 0
Start: 2018-01-29 | End: 2018-02-04

## 2018-01-29 RX ORDER — POLYETHYLENE GLYCOL 3350 17 G/17G
17 POWDER, FOR SOLUTION ORAL
Qty: 0 | Refills: 0 | DISCHARGE
Start: 2018-01-29

## 2018-01-29 RX ORDER — SENNA PLUS 8.6 MG/1
2 TABLET ORAL
Qty: 0 | Refills: 0 | DISCHARGE
Start: 2018-01-29

## 2018-01-29 RX ORDER — ACETAMINOPHEN 500 MG
3 TABLET ORAL
Qty: 0 | Refills: 0 | DISCHARGE
Start: 2018-01-29

## 2018-01-29 RX ORDER — DOCUSATE SODIUM 100 MG
1 CAPSULE ORAL
Qty: 0 | Refills: 0 | DISCHARGE
Start: 2018-01-29

## 2018-01-29 RX ADMIN — HYDROMORPHONE HYDROCHLORIDE 2 MILLIGRAM(S): 2 INJECTION INTRAMUSCULAR; INTRAVENOUS; SUBCUTANEOUS at 06:12

## 2018-01-29 RX ADMIN — HYDROMORPHONE HYDROCHLORIDE 2 MILLIGRAM(S): 2 INJECTION INTRAMUSCULAR; INTRAVENOUS; SUBCUTANEOUS at 07:08

## 2018-01-29 RX ADMIN — Medication 12.5 MILLIGRAM(S): at 06:12

## 2018-01-29 RX ADMIN — Medication 325 MILLIGRAM(S): at 06:12

## 2018-01-29 RX ADMIN — LISINOPRIL 20 MILLIGRAM(S): 2.5 TABLET ORAL at 06:12

## 2018-01-29 RX ADMIN — Medication 25 MILLIGRAM(S): at 06:12

## 2018-01-29 RX ADMIN — HYDROMORPHONE HYDROCHLORIDE 4 MILLIGRAM(S): 2 INJECTION INTRAMUSCULAR; INTRAVENOUS; SUBCUTANEOUS at 11:56

## 2018-01-29 RX ADMIN — Medication 975 MILLIGRAM(S): at 06:12

## 2018-01-29 NOTE — DISCHARGE NOTE ADULT - MEDICATION SUMMARY - MEDICATIONS TO TAKE
I will START or STAY ON the medications listed below when I get home from the hospital:    Dilaudid 4 mg oral tablet  -- 0.5- 1 tab(s) by mouth every 4 hours, As Needed -for moderate to severe pain MDD:6 tabs   -- Caution federal law prohibits the transfer of this drug to any person other  than the person for whom it was prescribed.  May cause drowsiness.  Alcohol may intensify this effect.  Use care when operating dangerous machinery.  This prescription cannot be refilled.  Using more of this medication than prescribed may cause serious breathing problems.    -- Indication: For Moderate to severe pain    acetaminophen 325 mg oral tablet  -- 3 tab(s) by mouth every 8 hours  Do not take more than 3,250mg in a day  -- Indication: For standing pain control *OVER THE COUNTER    LORazepam 0.5 mg oral tablet  -- orally 2 times a day, As Needed  **TAKE WITH CAUTION WHEN TAKING HYDROMORPHONE**  -- Indication: For Anxiety    bisacodyl 10 mg rectal suppository  -- 1 suppository(ies) rectally once a day, As needed, If no bowel movement by postoperative day #2  -- Indication: For Constipation    docusate sodium 100 mg oral capsule  -- 1 cap(s) by mouth 3 times a day  -- Indication: For Constipation    polyethylene glycol 3350 oral powder for reconstitution  -- 17 gram(s) by mouth once a day  -- Indication: For Constipation    senna oral tablet  -- 2 tab(s) by mouth once a day (at bedtime), As needed, Constipation  -- Indication: For Constipation

## 2018-01-29 NOTE — PROGRESS NOTE ADULT - SUBJECTIVE AND OBJECTIVE BOX
Pain Management Progress Note - [facility] [phone]  Patient was seen at 09:50.  HPI:  This is a 62y old Female with a history of Anxiety, HTN (hypertension), Obesity, and OA s/p L TKR on 1/26/18. Patient states that her pain is well controlled with Dilaudid without any complications.     Pertinent PMH: Pain at: ___Back ___Neck___Knee ___Hip ___Shoulder ___ Opioid tolerance    Pain is __x_ sharp ____dull ___burning _x__achy ___ Intensity: ____ mild _x___mod ____severe     Location ___x__surgical site _____cervical _____lumbar ____abd _____upper ext____lower ext    Worse with __x__activity ____movement ___x__physical therapy___ Rest    Improved with __x__medication ___x_rest ____physical therapy    celecoxib  oxyCODONE  ER Tablet  HYDROmorphone  Injectable  HYDROmorphone   Tablet  HYDROmorphone   Tablet  HYDROmorphone  Injectable  acetaminophen   Tablet  ketorolac   Injectable  pregabalin  lactated ringers.  aspirin enteric coated  ceFAZolin   IVPB  aluminum hydroxide/magnesium hydroxide/simethicone Suspension  ondansetron Injectable  pantoprazole    Tablet  magnesium hydroxide Suspension  polyethylene glycol 3350  bisacodyl Suppository  senna  docusate sodium  multivitamin  LORazepam     Tablet  lisinopril  hydrochlorothiazide  ceFAZolin  Injectable.  (ADM OVERRIDE)      ROS: Const:  _-__febrile   Eyes:___ENT:___CV: _-chest pain  Resp: ____sob  GI:_-__nausea _-__vomiting ___-_abd pain ___npo ___clears ___full diet __bm  :___ Musk: _+__pain _-__spasm  Skin:___ Neuro:  __-_thdkmmeh_-__xubzafllf____ numbness ___weakness ___paresthesia  Psych:___anxiety  Endo:___ Heme:___Allergy:___      01-29 @ 05:4569 mL/min/1.73M2    Hemoglobin: 9.3 g/dL (01-29 @ 05:46)  Hemoglobin: 9.9 g/dL (01-28 @ 05:54)    T(C): 36.4 (01-29-18 @ 08:30), Max: 36.6 (01-28-18 @ 18:49)  HR: 97 (01-29-18 @ 08:30) (92 - 113)  BP: 119/70 (01-29-18 @ 08:30) (104/67 - 124/62)  RR: 17 (01-29-18 @ 08:30) (16 - 17)  SpO2: 100% (01-29-18 @ 08:30) (96% - 100%)  Wt(kg): --     PHYSICAL EXAM:  Gen Appearance: __x_no acute distress _x__appropriate         Neuro: _x__SILT feet___x_ EOM Intact Psych: AAOX_3_, __x_mood/affect appropriate        Eyes: _x__conjunctiva WNL  _____ Pupils equal and round        ENT: __x_ears and nose atraumatic___ Hearing grossly intact        Neck: _x__trachea midline, no visible masses ___thyroid without palpable mass    Resp: _x__Nml WOB____No tactile fremitus ___clear to auscultation    Cardio: x___extremities free from edema ____pedal pulses palpable    GI/Abdomen: _x_soft _____ Nontender______Nondistended_____HSM    Lymphatic: ___no palpable nodes in neck  ___no palpable nodes calves and feet    Skin/Wound: _x__Incision, x___Dressing c/d/i,   ____surrounding tissues soft,  ___drain/chest tube present____    Muscular: EHL _5__/5  Gastrocnemius_5__/5    _x__absent clubbing/cyanosis         ASSESSMENT:   This is a 62y old Female with a history of Anxiety, HTN (hypertension), Obesity, and OA s/p L TKR on 1/26/18. Her pain is well controlled with current regimen without any complications.     Recommended Treatment PLAN:  1.  Dilaudid 2-4 mg po q4h prn, ordered in her pharmacy  2.  Dilaudid 0.5 mg IV q2h prn  3.  Tylenol 975 mg po q8h  4.  Lyrica 25 mg po TID as inpatient

## 2018-01-29 NOTE — DISCHARGE NOTE ADULT - MEDICATION SUMMARY - MEDICATIONS TO STOP TAKING
I will STOP taking the medications listed below when I get home from the hospital:    mupirocin 2% topical ointment  -- Apply on skin to both nostrils 2 times a day for 5 days   -- For external use only.

## 2018-01-29 NOTE — DISCHARGE NOTE ADULT - CARE PLAN
Principal Discharge DX:	Primary osteoarthritis of left knee  Goal:	Improved ambulation and decreased pain  Assessment and plan of treatment:	Weight bearing as tolerated on left leg with rolling walker.  No strenuous activity, heavy lifting, driving, tub bathing, or returning to work until cleared by MD.  You may shower--dressing is waterproof.  Remove dressing after post op day 7, then leave incision open to air.  Follow up with Dr. Carrillo to schedule an appt within 10-14 days.  If you don't have a bowel movement by post op day 3, then take Milk of Magnesia (over the counter).  If no bowel movement by at least post op day 5, then use a Dulcolax suppository (over the counter) and/or a Fleets enema--if still no bowel movement, call your MD.  Contact your doctor if you experience: fever greater than 101.5, chills, chest pain, difficulty breathing, bleeding, redness or heat around the incision.

## 2018-01-29 NOTE — DISCHARGE NOTE ADULT - PATIENT PORTAL LINK FT
“You can access the [facility] Patient Portal, offered by [facility], by registering with the following website: [email or web address]”

## 2018-01-29 NOTE — DISCHARGE NOTE ADULT - CARE PROVIDER_API CALL
Reddy Carrillo), Orthopaedic Surgery  [address] [place] [address]  Phone: [phone]  Fax: [phone]

## 2018-01-29 NOTE — DISCHARGE NOTE ADULT - PLAN OF CARE
Improved ambulation and decreased pain Weight bearing as tolerated on left leg with rolling walker.  No strenuous activity, heavy lifting, driving, tub bathing, or returning to work until cleared by MD.  You may shower--dressing is waterproof.  Remove dressing after post op day 7, then leave incision open to air.  Follow up with Dr. Carrillo to schedule an appt within 10-14 days.  If you don't have a bowel movement by post op day 3, then take Milk of Magnesia (over the counter).  If no bowel movement by at least post op day 5, then use a Dulcolax suppository (over the counter) and/or a Fleets enema--if still no bowel movement, call your MD.  Contact your doctor if you experience: fever greater than 101.5, chills, chest pain, difficulty breathing, bleeding, redness or heat around the incision.

## 2018-01-29 NOTE — PROGRESS NOTE ADULT - SUBJECTIVE AND OBJECTIVE BOX
POST OPERATIVE DAY #:  3  STATUS POST: TKA  SUBJECTIVE: Patient seen and examined this Am at bedside. Pt reports pain is controlled.    Vital Signs Last 24 Hrs  T(C): 36.4 (29 Jan 2018 05:43), Max: 36.9 (28 Jan 2018 09:10)  T(F): 97.5 (29 Jan 2018 05:43), Max: 98.5 (28 Jan 2018 09:10)  HR: 103 (29 Jan 2018 05:43) (87 - 113)  BP: 118/68 (29 Jan 2018 05:43) (93/65 - 124/62)  BP(mean): --  RR: 17 (29 Jan 2018 05:43) (16 - 17)  SpO2: 100% (29 Jan 2018 05:43) (96% - 100%)    Affected extremity:          Dressing: clean/dry/intact            Sensation:  intact to light touch :          Motor exam: 5/ 5 Tibialis Anterior/Gastrocnemius-Soleus          warm well perfused; capillary refill <3 seconds     LABS:                        9.3    7.8   )-----------( 218      ( 29 Jan 2018 05:46 )             29.8     01-29    140  |  98  |  13  ----------------------------<  123<H>  4.2   |  33<H>  |  0.90    Ca    9.2      29 Jan 2018 05:45            MEDICATIONS:acetaminophen   Tablet 975 milliGRAM(s) Oral every 8 hours  aluminum hydroxide/magnesium hydroxide/simethicone Suspension 30 milliLiter(s) Oral four times a day PRN  aspirin enteric coated 325 milliGRAM(s) Oral two times a day  bisacodyl Suppository 10 milliGRAM(s) Rectal daily PRN  docusate sodium 100 milliGRAM(s) Oral three times a day  hydrochlorothiazide 12.5 milliGRAM(s) Oral daily  HYDROmorphone   Tablet 2 milliGRAM(s) Oral every 4 hours PRN  HYDROmorphone   Tablet 4 milliGRAM(s) Oral every 4 hours PRN  HYDROmorphone  Injectable 0.5 milliGRAM(s) IV Push every 2 hours PRN  HYDROmorphone  Injectable 0.5 milliGRAM(s) IV Push every 10 minutes PRN  lactated ringers. 1000 milliLiter(s) IV Continuous <Continuous>  lisinopril 20 milliGRAM(s) Oral daily  LORazepam     Tablet 0.5 milliGRAM(s) Oral two times a day PRN  magnesium hydroxide Suspension 30 milliLiter(s) Oral daily PRN  multivitamin 1 Tablet(s) Oral daily  ondansetron Injectable 4 milliGRAM(s) IV Push every 6 hours PRN  pantoprazole    Tablet 40 milliGRAM(s) Oral daily  polyethylene glycol 3350 17 Gram(s) Oral daily  pregabalin 25 milliGRAM(s) Oral three times a day  senna 2 Tablet(s) Oral at bedtime PRN    Anticoagulation:  aspirin enteric coated 325 milliGRAM(s) Oral two times a day      Antibiotics:       Pain medications:   acetaminophen   Tablet 975 milliGRAM(s) Oral every 8 hours  HYDROmorphone   Tablet 2 milliGRAM(s) Oral every 4 hours PRN  HYDROmorphone   Tablet 4 milliGRAM(s) Oral every 4 hours PRN  HYDROmorphone  Injectable 0.5 milliGRAM(s) IV Push every 2 hours PRN  HYDROmorphone  Injectable 0.5 milliGRAM(s) IV Push every 10 minutes PRN  LORazepam     Tablet 0.5 milliGRAM(s) Oral two times a day PRN  ondansetron Injectable 4 milliGRAM(s) IV Push every 6 hours PRN  pregabalin 25 milliGRAM(s) Oral three times a day      RADIOLOGY & ADDITIONAL STUDIES:    A/P :   s/p TKA   -    Pain control  -    DVT ppx:  -    Periop abx:  completed  -    Check AM labs  -    Weight bearing status: WBAT  -    Physical Therapy  -    Dispo: Home

## 2018-01-29 NOTE — PROGRESS NOTE ADULT - SUBJECTIVE AND OBJECTIVE BOX
HPI:  62 year old female with osteoarthritis left knee with moderate left knee pain and deformity, decreased ROM and unsteady gait.  X rays confirm diagnosis.  Symptoms worse with stairs and after prolonged imobility and persist over time.  Patient day #3 post op left TKR with moderate left knee pain and malaise.      PAST MEDICAL & SURGICAL HISTORY:  Osteoarthritis: b/l knee  Anxiety  HTN (hypertension)  Obesity  Ovarian cyst  Anal fistula  Anal fissure  H/O wisdom tooth extraction  Fallopian tube pregnancy: right  History of arthroscopic knee surgery: bilateral at different times  H/O colonoscopy: 9/15  H/O breast surgery: L      MEDICATIONS  (STANDING):  acetaminophen   Tablet 975 milliGRAM(s) Oral every 8 hours  aspirin enteric coated 325 milliGRAM(s) Oral two times a day  docusate sodium 100 milliGRAM(s) Oral three times a day  hydrochlorothiazide 12.5 milliGRAM(s) Oral daily  lactated ringers. 1000 milliLiter(s) (80 mL/Hr) IV Continuous <Continuous>  lisinopril 20 milliGRAM(s) Oral daily  multivitamin 1 Tablet(s) Oral daily  pantoprazole    Tablet 40 milliGRAM(s) Oral daily  polyethylene glycol 3350 17 Gram(s) Oral daily  pregabalin 25 milliGRAM(s) Oral three times a day    MEDICATIONS  (PRN):  aluminum hydroxide/magnesium hydroxide/simethicone Suspension 30 milliLiter(s) Oral four times a day PRN Indigestion  bisacodyl Suppository 10 milliGRAM(s) Rectal daily PRN If no bowel movement by postoperative day #2  HYDROmorphone   Tablet 2 milliGRAM(s) Oral every 4 hours PRN Moderate Pain (4 - 6)  HYDROmorphone   Tablet 4 milliGRAM(s) Oral every 4 hours PRN Severe Pain (7 - 10)  HYDROmorphone  Injectable 0.5 milliGRAM(s) IV Push every 2 hours PRN breakthrough pain  HYDROmorphone  Injectable 0.5 milliGRAM(s) IV Push every 10 minutes PRN Severe Pain (7 - 10)  LORazepam     Tablet 0.5 milliGRAM(s) Oral two times a day PRN Anxiety  magnesium hydroxide Suspension 30 milliLiter(s) Oral daily PRN Constipation  ondansetron Injectable 4 milliGRAM(s) IV Push every 6 hours PRN Nausea and/or Vomiting  senna 2 Tablet(s) Oral at bedtime PRN Constipation      Allergies    Bananas (Eye Irritation; Vomiting)  [place] (Eye Irritation; Vomiting)  No Known Drug Allergies    REVIEW OF SYSTEMS    General:  malaise	  Skin/Breast: normal  Ophthalmologic: negative  ENMT:	normal  Respiratory and Thorax: normal  Cardiovascular:	normal  Gastrointestinal:	normal  Genitourinary:	normal  Musculoskeletal:	 left knee swelling   Neurological:	normal  Psychiatric:	normal  Hematology/Lymphatics:	 negative  Endocrine:	negative  Allergic/Immunologic:	negative      PHYSICAL EXAM:    Vital Signs Last 24 Hrs  T(C): 36.4 (29 Jan 2018 05:43), Max: 36.9 (28 Jan 2018 09:10)  T(F): 97.5 (29 Jan 2018 05:43), Max: 98.5 (28 Jan 2018 09:10)  HR: 103 (29 Jan 2018 05:43) (87 - 113)  BP: 118/68 (29 Jan 2018 05:43) (93/65 - 124/62)  BP(mean): --  RR: 17 (29 Jan 2018 05:43) (16 - 17)  SpO2: 100% (29 Jan 2018 05:43) (96% - 100%)    Constitutional: WDWNF in NAD  Eyes: conj pale  ENMT: negative  Neck: supple  Breasts: not examined   Back: negative  Respiratory: clear to P&A  Cardiovascular: no MRGT or H  Gastrointestinal: normal bowel sounds  Genitourinary: neg  Rectal: not examined  Extremities:  normal  Vascular: normal  Neurological: normal  Skin: negative  Lymph Nodes: negative  Musculoskeletal:   decreased ROM  left knee  Psychiatric: anxiety                        9.9    6.9   )-----------( 213      ( 28 Jan 2018 05:54 )             31.2       01-28    140  |  100  |  17  ----------------------------<  119<H>  4.0   |  29  |  1.27    Ca    8.9      28 Jan 2018 05:53

## 2018-01-31 DIAGNOSIS — M17.12 UNILATERAL PRIMARY OSTEOARTHRITIS, LEFT KNEE: ICD-10-CM

## 2018-01-31 DIAGNOSIS — E66.09 OTHER OBESITY DUE TO EXCESS CALORIES: ICD-10-CM

## 2018-02-01 PROCEDURE — 85027 COMPLETE CBC AUTOMATED: CPT

## 2018-02-01 PROCEDURE — 97110 THERAPEUTIC EXERCISES: CPT

## 2018-02-01 PROCEDURE — 97116 GAIT TRAINING THERAPY: CPT

## 2018-02-01 PROCEDURE — 80048 BASIC METABOLIC PNL TOTAL CA: CPT

## 2018-02-01 PROCEDURE — 36415 COLL VENOUS BLD VENIPUNCTURE: CPT

## 2018-02-01 PROCEDURE — 88300 SURGICAL PATH GROSS: CPT

## 2018-02-01 PROCEDURE — 73560 X-RAY EXAM OF KNEE 1 OR 2: CPT

## 2018-02-01 PROCEDURE — C1713: CPT

## 2018-02-01 PROCEDURE — C1776: CPT

## 2018-02-01 PROCEDURE — 97530 THERAPEUTIC ACTIVITIES: CPT

## 2018-02-01 PROCEDURE — C1889: CPT

## 2018-02-13 ENCOUNTER — APPOINTMENT (OUTPATIENT)
Dept: ORTHOPEDIC SURGERY | Facility: CLINIC | Age: 63
End: 2018-02-13
Payer: COMMERCIAL

## 2018-02-13 VITALS — HEIGHT: 60 IN | BODY MASS INDEX: 31.02 KG/M2 | WEIGHT: 158 LBS

## 2018-02-13 DIAGNOSIS — Z87.39 PERSONAL HISTORY OF OTHER DISEASES OF THE MUSCULOSKELETAL SYSTEM AND CONNECTIVE TISSUE: ICD-10-CM

## 2018-02-13 DIAGNOSIS — Z86.79 PERSONAL HISTORY OF OTHER DISEASES OF THE CIRCULATORY SYSTEM: ICD-10-CM

## 2018-02-13 DIAGNOSIS — Z78.9 OTHER SPECIFIED HEALTH STATUS: ICD-10-CM

## 2018-02-13 DIAGNOSIS — Z82.61 FAMILY HISTORY OF ARTHRITIS: ICD-10-CM

## 2018-02-13 DIAGNOSIS — R23.2 FLUSHING: ICD-10-CM

## 2018-02-13 DIAGNOSIS — Z87.898 PERSONAL HISTORY OF OTHER SPECIFIED CONDITIONS: ICD-10-CM

## 2018-02-13 PROCEDURE — 99024 POSTOP FOLLOW-UP VISIT: CPT

## 2018-02-14 ENCOUNTER — TRANSCRIPTION ENCOUNTER (OUTPATIENT)
Age: 63
End: 2018-02-14

## 2018-03-13 ENCOUNTER — APPOINTMENT (OUTPATIENT)
Dept: ORTHOPEDIC SURGERY | Facility: CLINIC | Age: 63
End: 2018-03-13
Payer: COMMERCIAL

## 2018-03-13 VITALS — WEIGHT: 158 LBS | BODY MASS INDEX: 31.02 KG/M2 | HEIGHT: 60 IN

## 2018-03-13 PROCEDURE — 99024 POSTOP FOLLOW-UP VISIT: CPT

## 2018-03-13 PROCEDURE — 73562 X-RAY EXAM OF KNEE 3: CPT | Mod: LT

## 2018-04-10 ENCOUNTER — APPOINTMENT (OUTPATIENT)
Dept: ORTHOPEDIC SURGERY | Facility: CLINIC | Age: 63
End: 2018-04-10
Payer: COMMERCIAL

## 2018-04-10 PROCEDURE — 20610 DRAIN/INJ JOINT/BURSA W/O US: CPT | Mod: LT

## 2018-04-10 PROCEDURE — 99212 OFFICE O/P EST SF 10 MIN: CPT | Mod: 25

## 2018-04-20 ENCOUNTER — MESSAGE (OUTPATIENT)
Age: 63
End: 2018-04-20

## 2018-05-22 ENCOUNTER — APPOINTMENT (OUTPATIENT)
Dept: ORTHOPEDIC SURGERY | Facility: CLINIC | Age: 63
End: 2018-05-22
Payer: COMMERCIAL

## 2018-05-22 VITALS
HEIGHT: 60 IN | BODY MASS INDEX: 31.02 KG/M2 | DIASTOLIC BLOOD PRESSURE: 82 MMHG | HEART RATE: 106 BPM | SYSTOLIC BLOOD PRESSURE: 152 MMHG | WEIGHT: 158 LBS

## 2018-05-22 PROCEDURE — 73562 X-RAY EXAM OF KNEE 3: CPT | Mod: LT

## 2018-05-22 PROCEDURE — 99213 OFFICE O/P EST LOW 20 MIN: CPT

## 2018-07-26 ENCOUNTER — APPOINTMENT (OUTPATIENT)
Dept: ORTHOPEDIC SURGERY | Facility: CLINIC | Age: 63
End: 2018-07-26
Payer: COMMERCIAL

## 2018-07-26 PROCEDURE — 99213 OFFICE O/P EST LOW 20 MIN: CPT | Mod: 25

## 2018-07-26 PROCEDURE — 73564 X-RAY EXAM KNEE 4 OR MORE: CPT | Mod: LT

## 2018-07-26 PROCEDURE — 20610 DRAIN/INJ JOINT/BURSA W/O US: CPT | Mod: RT

## 2018-11-08 ENCOUNTER — APPOINTMENT (OUTPATIENT)
Dept: ORTHOPEDIC SURGERY | Facility: CLINIC | Age: 63
End: 2018-11-08

## 2018-11-08 NOTE — H&P ADULT - CLICK TO LAUNCH ORM
Consider psych consult. Pt is on 5 medications for anxiety/insomnia. She has cyclical vomiting issues. She stops eating for days when she gets stressed out. Pt reports she started having (likely psychosomatic) seizures after her father passed.Pt has very poor coping mechanisms. Also a former alcoholic.     -Continue Zoloft, buspirone  -Benadryl nightly for sleep  -Patient counseled to follow-up with her PCP regarding her antianxiety meds.  She seems to not be well controlled on her current regimen, and may benefit from an adjustment.   .

## 2019-02-19 ENCOUNTER — APPOINTMENT (OUTPATIENT)
Dept: ORTHOPEDIC SURGERY | Facility: CLINIC | Age: 64
End: 2019-02-19

## 2019-03-26 ENCOUNTER — APPOINTMENT (OUTPATIENT)
Dept: ORTHOPEDIC SURGERY | Facility: CLINIC | Age: 64
End: 2019-03-26
Payer: COMMERCIAL

## 2019-03-26 VITALS
HEART RATE: 112 BPM | BODY MASS INDEX: 33.38 KG/M2 | HEIGHT: 60 IN | DIASTOLIC BLOOD PRESSURE: 95 MMHG | WEIGHT: 170 LBS | SYSTOLIC BLOOD PRESSURE: 148 MMHG

## 2019-03-26 PROCEDURE — 20610 DRAIN/INJ JOINT/BURSA W/O US: CPT | Mod: RT

## 2019-03-26 PROCEDURE — 73562 X-RAY EXAM OF KNEE 3: CPT | Mod: LT

## 2019-03-26 PROCEDURE — 99213 OFFICE O/P EST LOW 20 MIN: CPT | Mod: 25

## 2019-03-26 NOTE — ADDENDUM
[FreeTextEntry1] : Documented by Meka Floyd, solely acting as a scribe for Dr. Reddy Carrillo on 03/26/2019.\par \par All medical record entries made by the Scribe were at my, Dr. Reddy Carrillo, direction and personally dictated by me on 03/26/2019. I have reviewed the chart and agree that the record accurately reflects my personal performance of the history, physical exam, assessment and plan. I have also personally directed, reviewed, and agreed with the chart.

## 2019-03-26 NOTE — PROCEDURE
[de-identified] : The right knee was prepped with Betadine and under sterile condition the 40 mg Depo-Medrol and then 5 cc Lidocaine and 20 cc Marcaine injection was performed with a 21 gauge needle. The needle was introduced into the joint, aspiration was performed to ensure intra-articular placement and the medication was injected. Upon withdrawal of the needle the site was cleaned with alcohol and a band aid applied. The patient tolerated the injection well and there were no adverse effects. Post injection instructions included no strenuous activity for 24 hours, cryotherapy and if there are any adverse effects to contact the office.

## 2019-03-26 NOTE — DISCUSSION/SUMMARY
[de-identified] : 63 year old female presents s/p left TKA, DOS: 01/26/18 with right knee DJD. Discussed at length the natural history of degenerative arthritis and reviewed non-operative and operative treatment. At this point I suggested physiotherapy, NSAIDs and/or Tylenol, and cortisone injections. A prescription for physical therapy was provided. The patient elected to receive a knee injection today, which they tolerated well. We talked about an injection. Discussed at length with the patient the planned steroid and lidocaine injection. The risks, benefits, convalescence and alternatives were reviewed. The possible side effects discussed included but were not limited to: pain, swelling, heat and redness. There symptoms are generally mild but if they are extensive then contact the office. Giving pain relievers by mouth such as NSAIDs or Tylenol can generally treat the reactions to steroid and lidocaine. Rare cases of infection have been noted. Rash, hives and itching may occur post injection. If you have muscle pain or cramps, flushing and or swelling of the face, rapid heartbeat, nausea, dizziness, fever, chills, headache, difficulty breathing, swelling in the arms or legs, or have a prickly feeling of your skin, contact a health care provider immediately. In the future if the pain becomes disabling may need total knee arthroplasty. Information regarding the new office was provided to the patient, and she was advised to contact the office if any questions remain. Patient will follow up in 3 months if her insurance is accepted, otherwise, she will be referred to Dr. Lopez for further treatment.

## 2019-03-26 NOTE — HISTORY OF PRESENT ILLNESS
[de-identified] : 63 year old female presents to the office s/p left TKA, DOS: 01/26/18 office for a follow-up evaluation of right knee pain. Patient was last seen on 07/26/18 where she elected to receive a right knee injection in view of her worsening pain. She notes that while injections initially helped to minimize her pain, her symptoms have since returned. Patient reports associated swelling, but denies locking, buckling, and clicking. She inquires about injections at today's visit. Patient reports that her left knee is progressing well, with minimal residual symptoms.

## 2019-03-26 NOTE — PHYSICAL EXAM
[Normal] : Gait: normal [LE] : Sensory: Intact in bilateral lower extremities [DP] : dorsalis pedis 2+ and symmetric bilaterally [PT] : posterior tibial 2+ and symmetric bilaterally [Poor Appearance] : well-appearing [Acute Distress] : not in acute distress [Obese] : not obese [de-identified] : General appearance: Well nourished, well developed, pleasant, alert, and oriented x3.\par Respiratory: Breathing not labored, in no acute distress.\par HEENT: Normocephalic. EOM intact. Sclerae are clear.\par CV: No apparent abnormalities. No lower leg edema. No varicosities. Pedal pulses are palpable.\par Neurologic: Sensation is intact to light touch in the upper and lower extremities. \par Strength: No muscle weakness.\par Dermatologic: No apparent skin lesions or rash.\par Spine: C spine and L spine appear normal and move freely, normal and nontender.\par Upper Extremities: Hands, wrists, and elbows are normal and move freely. Shoulders are normal and move freely. All range of motion is symmetrical.\par Normal body habitus. Pulses are palpable.\par Review of systems, please see form for complete details. Medical data sheet was reviewed.\par \par Left knee: FROM hip, midline incision well healed, mild keloid no effusion, 0 - 120 , no crepitus, no medial pain, no lateral pain, no Lachman, no pivot shift, no anterior drawer, no posterior drawer, stable, anatomic alignment\par Right knee: FROM hip, no effusion, 5 - 90, no crepitus, no medial pain, no lateral pain, no Lachman, no pivot shift, no anterior drawer, no posterior drawer, stable, anatomic alignment\par   [de-identified] : X-rays, taken at the office today show:\par \par Right Knee x-rays:\par Standing AP, Lateral, and Merchant films:\par Severe medial DJD, grade 4 changes, varus alignment, mild to moderate patellofemoral arthritis.\par \par Left Knee x-rays (s/p left TKA):\par Standing AP, Lateral, and Merchant films show:\par Total knee replacement hardware in neutral alignment. Without evidence of loosening. well centered patella\par  \par \par \par

## 2019-06-10 PROBLEM — Z00.00 ENCOUNTER FOR PREVENTIVE HEALTH EXAMINATION: Noted: 2019-06-10

## 2019-06-25 ENCOUNTER — APPOINTMENT (OUTPATIENT)
Dept: ORTHOPEDIC SURGERY | Facility: CLINIC | Age: 64
End: 2019-06-25

## 2019-07-08 ENCOUNTER — FORM ENCOUNTER (OUTPATIENT)
Age: 64
End: 2019-07-08

## 2019-07-09 ENCOUNTER — APPOINTMENT (OUTPATIENT)
Dept: ORTHOPEDIC SURGERY | Facility: CLINIC | Age: 64
End: 2019-07-09
Payer: COMMERCIAL

## 2019-07-09 ENCOUNTER — OUTPATIENT (OUTPATIENT)
Dept: OUTPATIENT SERVICES | Facility: HOSPITAL | Age: 64
LOS: 1 days | End: 2019-07-09
Payer: COMMERCIAL

## 2019-07-09 VITALS — HEART RATE: 90 BPM | WEIGHT: 165 LBS | OXYGEN SATURATION: 99 % | BODY MASS INDEX: 32.39 KG/M2 | HEIGHT: 60 IN

## 2019-07-09 DIAGNOSIS — K08.499 PARTIAL LOSS OF TEETH DUE TO OTHER SPECIFIED CAUSE, UNSPECIFIED CLASS: Chronic | ICD-10-CM

## 2019-07-09 DIAGNOSIS — O00.109 UNSPECIFIED TUBAL PREGNANCY WITHOUT INTRAUTERINE PREGNANCY: Chronic | ICD-10-CM

## 2019-07-09 DIAGNOSIS — Z98.890 OTHER SPECIFIED POSTPROCEDURAL STATES: Chronic | ICD-10-CM

## 2019-07-09 DIAGNOSIS — M70.62 TROCHANTERIC BURSITIS, LEFT HIP: ICD-10-CM

## 2019-07-09 PROCEDURE — 73521 X-RAY EXAM HIPS BI 2 VIEWS: CPT | Mod: 26

## 2019-07-09 PROCEDURE — 99213 OFFICE O/P EST LOW 20 MIN: CPT

## 2019-07-09 PROCEDURE — 73521 X-RAY EXAM HIPS BI 2 VIEWS: CPT

## 2019-07-09 PROCEDURE — 73562 X-RAY EXAM OF KNEE 3: CPT

## 2019-07-09 PROCEDURE — 73562 X-RAY EXAM OF KNEE 3: CPT | Mod: 26,50

## 2019-07-12 NOTE — REASON FOR VISIT
[Initial Visit] : an initial visit for [Hip Pain] : hip pain [Knee Pain] : knee pain [Osteoarthritis, Knee] : osteoarthritis, knee [FreeTextEntry2] : right knee, left hip

## 2019-07-12 NOTE — ASSESSMENT
[FreeTextEntry1] : plan:\par -we will request HA injections for the right knee\par -exercises provided for abductor strengthening\par -NSAIDs for episodes of discomfort....

## 2019-07-12 NOTE — REVIEW OF SYSTEMS
[Joint Pain] : joint pain [Joint Stiffness] : joint stiffness [Negative] : Endocrine [FreeTextEntry9] : right knee/ left hip

## 2019-07-12 NOTE — HISTORY OF PRESENT ILLNESS
[de-identified] : 64 female with right knee pain for many years. she had a left TKA by Dr. Carrillo 18m ago. that knee is doing well. the right knee is responding less to cortisone than previously. she has discomfort daily but is able to do all activities. the left hip has intermittent episodes (2x per week) where she has sharp brief discomfort posterior/superior to the GT. she would like to discuss HA injection into the right knee

## 2019-07-12 NOTE — PHYSICAL EXAM
[de-identified] : right knee:\par varus alignment\par ROm 0-95\par stable v/v\par stable AP translation\par crepitus through arc of motion\par medial joint line tenderness\par \par Left hip:\par full ROM\par no pain with ROm\par TTP over GT\par 5/5 hip flexors and abductors\par no groin pain [de-identified] : right knee:\par severe medial compartment OA\par severe lateral facet PF OA\par \par Left knee:\par well fixed cemented TKA\par \par bilateral hips:\par no OA\par

## 2019-07-18 RX ORDER — DICLOFENAC SODIUM 75 MG/1
1 TABLET, DELAYED RELEASE ORAL
Qty: 0 | Refills: 0 | DISCHARGE

## 2019-10-11 PROBLEM — K60.2 ANAL FISSURE, UNSPECIFIED: Chronic | Status: ACTIVE | Noted: 2018-01-25

## 2019-10-11 PROBLEM — N83.209 UNSPECIFIED OVARIAN CYST, UNSPECIFIED SIDE: Chronic | Status: ACTIVE | Noted: 2018-01-25

## 2019-10-11 PROBLEM — E66.9 OBESITY, UNSPECIFIED: Chronic | Status: ACTIVE | Noted: 2018-01-25

## 2019-10-11 PROBLEM — F41.9 ANXIETY DISORDER, UNSPECIFIED: Chronic | Status: ACTIVE | Noted: 2018-01-25

## 2019-10-11 PROBLEM — K60.3 ANAL FISTULA: Chronic | Status: ACTIVE | Noted: 2018-01-25

## 2019-10-11 PROBLEM — M19.90 UNSPECIFIED OSTEOARTHRITIS, UNSPECIFIED SITE: Chronic | Status: ACTIVE | Noted: 2018-01-25

## 2019-10-11 PROBLEM — I10 ESSENTIAL (PRIMARY) HYPERTENSION: Chronic | Status: ACTIVE | Noted: 2018-01-25

## 2019-10-16 ENCOUNTER — APPOINTMENT (OUTPATIENT)
Dept: ORTHOPEDIC SURGERY | Facility: CLINIC | Age: 64
End: 2019-10-16
Payer: COMMERCIAL

## 2019-10-16 PROCEDURE — 20610 DRAIN/INJ JOINT/BURSA W/O US: CPT | Mod: RT

## 2019-10-18 NOTE — PROCEDURE
[de-identified] : After obtaining verbal consent and under normal sterile conditions the right knee joint was injected with gel one prefilled syringe.

## 2019-10-18 NOTE — PHYSICAL EXAM
[de-identified] : General: Not in acute distress, dressed appropriately, sitting on examination table\par Skin: Warm and dry, normal turgor, no rashes\par Neurological: AOx3, Cranial nerves grossly in tact\par Psych: Mood and affect appropriate\par \par Right Knee: No swelling edema erythema redness or drainage. tender anteriorly. Alignment: Neutral. ROM: 0-120.  Stable. 5/5 Strength. DNVI. Ambulates without devices.\par

## 2019-10-18 NOTE — ASSESSMENT
[FreeTextEntry1] : Assessment/plan\par Right knee arthritis\par \par The right knee was injected with gel one as described above, today she'll rest ice and use Tylenol as needed for pain, she will follow up in 6 months or sooner p.r.n.\par \par All medical record entries made by the PA/Joel/Fellow are at my, Dr. Black Lopez's direction and personally dictated by me on 10/16/2019]. I have reviewed the chart and agree that the record accurately reflects my personal performance of the history, physical exam, assessment, and plan. I have also personally directed reviewed, and agreed with the chart.\par

## 2019-10-18 NOTE — HISTORY OF PRESENT ILLNESS
[de-identified] : Here today for right knee gel injection, no changes since her last visit.\par \par right knee gel one inj\par lot: [id number]\par exp: 2020-08-04

## 2019-12-18 ENCOUNTER — APPOINTMENT (OUTPATIENT)
Dept: ORTHOPEDIC SURGERY | Facility: CLINIC | Age: 64
End: 2019-12-18
Payer: COMMERCIAL

## 2019-12-18 PROCEDURE — 99213 OFFICE O/P EST LOW 20 MIN: CPT

## 2019-12-20 ENCOUNTER — OTHER (OUTPATIENT)
Age: 64
End: 2019-12-20

## 2019-12-27 NOTE — DISCUSSION/SUMMARY
[de-identified] : - the risks and benefits of total knee arthroplasty were discussed with the patient and she would like to proceed with R TKA\par - no restrictions\par - given Rx for mobic\par - RICE and NSAIDs for swelling and pain\par - will need pre operative clearance\par - follow up for surgery\par \par \par BRENNAN will benefit from the proposed procedure, she has failed a conservative treatment plan of supervised physical therapy, anti-inflammatory medications, and activity modification. She continues to have pain impacting her ability to perform ADL's and has a decreasing QoL.  We will schedule this for the earliest mutually convenient time after the appropriate pre-op laboratory tests and clearances are obtained.  All questions were answered and a thorough explanation of RBA were given.\par \par All medical record entries made by the PA/Joel/Fellow are at my, Dr. Black Lopez's direction and personally dictated by me on 12/18/2019]. I have reviewed the chart and agree that the record accurately reflects my personal performance of the history, physical exam, assessment, and plan. I have also personally directed reviewed, and agreed with the chart.\par

## 2019-12-27 NOTE — HISTORY OF PRESENT ILLNESS
[de-identified] : 64 f presents to clinic for follow up of her right knee pain. Patient had a gel injection about five weeks ago which gave her about three weeks of relief but says her pain is back to baseline. She says her knee pain is significantly effecting her QOL and she would like to proceed with a R TKA in February. She did have a L TKA done with Dr Carrillo in 2018 which is doing very well.\par \par PMH significant for htn. No blood thinners. Does not smoke.

## 2019-12-27 NOTE — ASSESSMENT
[FreeTextEntry1] : 64 F with severe R knee OA. Patient has failed non operative management of her right knee pain would like to proceed with R TKA.

## 2019-12-27 NOTE — PHYSICAL EXAM
[de-identified] : NAD [de-identified] : RLE: No swelling edema erythema redness or drainage. tender anteriorly. Alignment: Neutral. ROM: 0-120. Stable. 5/5 Strength. DNVI. Ambulates without devices. [de-identified] : Review of previous right knee XR shows severe OA.

## 2020-08-18 ENCOUNTER — APPOINTMENT (OUTPATIENT)
Dept: ORTHOPEDIC SURGERY | Facility: CLINIC | Age: 65
End: 2020-08-18
Payer: COMMERCIAL

## 2020-08-18 PROCEDURE — 20610 DRAIN/INJ JOINT/BURSA W/O US: CPT | Mod: RT

## 2020-08-18 PROCEDURE — 99212 OFFICE O/P EST SF 10 MIN: CPT | Mod: 25

## 2020-08-21 NOTE — REVIEW OF SYSTEMS
[Joint Pain] : joint pain [Negative] : Psychiatric [Arthralgia] : no arthralgia [Joint Stiffness] : no joint stiffness [Joint Swelling] : no joint swelling

## 2020-08-21 NOTE — ASSESSMENT
[FreeTextEntry1] : Assessment\par Right knee arthritis\par \par Plan\par After obtaining verbal consent and under normal sterile conditions the right knee joint was injected with GEL 1\par F/U as needed \par  \par All medical record entries made by the PA/Joel/Fellow are at my, Dr. Black Lopez's direction and personally dictated by me on 08/18/2020]. I have reviewed the chart and agree that the record accurately reflects my personal performance of the history, physical exam, assessment, and plan. I have also personally directed reviewed, and agreed with the chart.\par

## 2020-08-21 NOTE — HISTORY OF PRESENT ILLNESS
[de-identified] : 66 y/o F, here for right knee gel 1 injection. Pt last had a gel injection to the right knee on 10/16/19 that provided relief of pain for a couple of weeks, pain gradually returned.

## 2020-08-21 NOTE — PROCEDURE
[de-identified] : After obtaining verbal consent and under normal sterile conditions the right knee joint was injected GEL 1.\par \par Right knee Gel 1\par LOT: [id number]\par EXP: 2021-04-19\par

## 2020-08-21 NOTE — END OF VISIT
[FreeTextEntry3] : By signing my name below, I, Nirmala Rubio, attest that this documentation has been prepared under the direction and in the presence of Rustam Orellana PA-C.

## 2020-08-21 NOTE — PHYSICAL EXAM
[de-identified] : General: Not in acute distress, dressed appropriately, sitting on examination table\par Skin: Warm and dry, normal turgor, no rashes\par Neurological: AOx3, Cranial nerves grossly in tact\par Psych: Mood and affect appropriate\par \par Right Knee: No swelling edema erythema redness or drainage. tender anteriorly. Alignment: Neutral ROM: 0-120 Stable. 5/5 Strength. DNVI. Ambulates without devices.\par \par \par  [de-identified] : No imaging today.\par

## 2020-11-16 ENCOUNTER — NON-APPOINTMENT (OUTPATIENT)
Age: 65
End: 2020-11-16

## 2020-11-20 ENCOUNTER — APPOINTMENT (OUTPATIENT)
Dept: ORTHOPEDIC SURGERY | Facility: CLINIC | Age: 65
End: 2020-11-20
Payer: COMMERCIAL

## 2020-11-20 DIAGNOSIS — Z87.891 PERSONAL HISTORY OF NICOTINE DEPENDENCE: ICD-10-CM

## 2020-11-20 PROCEDURE — 20610 DRAIN/INJ JOINT/BURSA W/O US: CPT | Mod: RT

## 2020-11-20 PROCEDURE — 99213 OFFICE O/P EST LOW 20 MIN: CPT | Mod: 25

## 2020-11-20 NOTE — HISTORY OF PRESENT ILLNESS
[Pain Location] : pain [] : right knee [Worsening] : worsening [de-identified] : 66 y/o female with increasing right knee pain. Patient had GEL-! inj in 8/2020.

## 2020-11-20 NOTE — PHYSICAL EXAM
[Normal] : No costovertebral angle tenderness and no spinal tenderness [de-identified] : limited rom with crepitus right knee\par no effusion\par no instability [de-identified] : intact

## 2020-11-20 NOTE — PROCEDURE
[Injection] : Injection [Right] : of the right [Knee Joint] : knee joint [Osteoarthritis] : Osteoarthritis [Patient] : patient [Risk] : risk [Benefits] : benefits [Bleeding] : bleeding [Verbal Consent Obtained] : verbal consent was obtained prior to the procedure [Alcohol] : Alcohol [Betadine] : Betadine [Lateral] : lateral [Anterior] : anterior [20] : a 20-gauge [Triamcinolone 40mg/mL___(mL)] : [unfilled] ~UmL of 40mg/mL triamcinolone [Bandage Applied] : a bandage [Tolerated Well] : The patient tolerated the procedure well [None] : none [No Strenuous Activity___day(s)] : avoid strenuous activity for [unfilled] day(s) [PRN] : as needed

## 2020-11-20 NOTE — ASSESSMENT
[FreeTextEntry1] : 66 yo female with increasing right knee pain.\par \par \par \par \par \par Plan cortisone injection\par          HEP\par           F/U  PRN

## 2021-03-31 ENCOUNTER — APPOINTMENT (OUTPATIENT)
Dept: ORTHOPEDIC SURGERY | Facility: CLINIC | Age: 66
End: 2021-03-31
Payer: COMMERCIAL

## 2021-03-31 PROCEDURE — 99072 ADDL SUPL MATRL&STAF TM PHE: CPT

## 2021-03-31 PROCEDURE — 20610 DRAIN/INJ JOINT/BURSA W/O US: CPT | Mod: RT

## 2021-04-03 NOTE — HISTORY OF PRESENT ILLNESS
[Pain Location] : pain [] : right knee [de-identified] : 64 y/o female with increasing right knee pain. Patient presents for gel-one injection right knee today

## 2021-04-03 NOTE — PROCEDURE
[de-identified] : After obtaining verbal consent and under normal sterile conditions the RIGHT knee joint was injected with Gel-One.\par \par LOT NO: [id number]\par EXP: 01/05/2021

## 2021-04-03 NOTE — PHYSICAL EXAM
[de-identified] : Musculoskeletal: Gait: normal . limited rom with crepitus right knee\par no effusion\par no instability. \par intact. \par Cardiovascular: No swelling, no edema, normal pedal pulses and normal temperature. \par Constitutional: Alert and in no acute distress. \par Psychiatric: Oriented to person, place, and time, insight and judgement were intact and the affect was normal. \par Pulmonary: No respiratory distress and lungs were clear to auscultation bilaterally. \par Abdomen: Normal bowel sounds, soft, non-tender, no hepato-splenomegaly and no abdominal mass palpated. \par Lymphatic: no peripheral adenopathy appreciated. \par Back: No costovertebral angle tenderness and no spinal tenderness.  [de-identified] : No imaging today.

## 2021-09-27 ENCOUNTER — RESULT REVIEW (OUTPATIENT)
Age: 66
End: 2021-09-27

## 2021-09-27 ENCOUNTER — OUTPATIENT (OUTPATIENT)
Dept: OUTPATIENT SERVICES | Facility: HOSPITAL | Age: 66
LOS: 1 days | End: 2021-09-27
Payer: COMMERCIAL

## 2021-09-27 ENCOUNTER — APPOINTMENT (OUTPATIENT)
Dept: ORTHOPEDIC SURGERY | Facility: CLINIC | Age: 66
End: 2021-09-27
Payer: COMMERCIAL

## 2021-09-27 VITALS
HEART RATE: 60 BPM | SYSTOLIC BLOOD PRESSURE: 148 MMHG | WEIGHT: 165 LBS | HEIGHT: 60 IN | OXYGEN SATURATION: 97 % | DIASTOLIC BLOOD PRESSURE: 90 MMHG | BODY MASS INDEX: 32.39 KG/M2

## 2021-09-27 DIAGNOSIS — O00.109 UNSPECIFIED TUBAL PREGNANCY WITHOUT INTRAUTERINE PREGNANCY: Chronic | ICD-10-CM

## 2021-09-27 DIAGNOSIS — Z98.890 OTHER SPECIFIED POSTPROCEDURAL STATES: Chronic | ICD-10-CM

## 2021-09-27 DIAGNOSIS — K08.499 PARTIAL LOSS OF TEETH DUE TO OTHER SPECIFIED CAUSE, UNSPECIFIED CLASS: Chronic | ICD-10-CM

## 2021-09-27 PROCEDURE — 73564 X-RAY EXAM KNEE 4 OR MORE: CPT | Mod: 26,50

## 2021-09-27 PROCEDURE — 72170 X-RAY EXAM OF PELVIS: CPT | Mod: 26

## 2021-09-27 PROCEDURE — 99214 OFFICE O/P EST MOD 30 MIN: CPT | Mod: 25

## 2021-09-27 PROCEDURE — 73564 X-RAY EXAM KNEE 4 OR MORE: CPT

## 2021-09-27 PROCEDURE — 72170 X-RAY EXAM OF PELVIS: CPT

## 2021-09-27 PROCEDURE — 20610 DRAIN/INJ JOINT/BURSA W/O US: CPT | Mod: RT

## 2021-09-27 RX ORDER — HYALURONATE SODIUM 20 MG/2 ML
20 SYRINGE (ML) INTRAARTICULAR
Qty: 1 | Refills: 0 | Status: ACTIVE | OUTPATIENT
Start: 2021-09-27

## 2021-09-27 NOTE — PHYSICAL EXAM
[de-identified] : General appearance: well nourished and hydrated, pleasant, alert and oriented x 3, cooperative.  \par HEENT: normocephalic, EOM intact, wearing mask, external auditory canal clear.  \par Cardiovascular: no lower leg edema, no varicosities, dorsalis pedis pulses palpable and symmetric.  \par Lymphatics: no palpable lymphadenopathy, no lymphedema.  \par Neurologic: sensation is normal, no muscle weakness in upper or lower extremities, patella tendon reflexes present and symmetric.  \par Dermatologic: skin moist, warm, no rash.  \par Spine: cervical spine with normal lordosis and painless range of motion, thoracic spine with normal kyphosis and painless range of motion, lumbosacral spine with normal lordosis and painless range of motion. \par Gait: normal.  \par \par Left knee:\par - Soft tissue swelling: none\par - Ecchymosis: none\par - Erythema: none\par - Effusion: small\par - Wounds: healed midline incision\par - Alignment: normal\par - Tenderness: none\par - ROM: 0-110\par - Collateral laxity: symmetrically mildly increased to varus and valgus\par - Cruciate laxity: between 5-10mm ant/post translation on flexion drawers\par - Popliteal angle (degrees): 45\par - Quad strength: 5/5\par \par Right knee:\par - Soft tissue swelling: moderate\par - Ecchymosis: none\par - Erythema: none\par - Effusion: moderate\par - Wounds: none\par - Alignment: varus\par - Tenderness: medial joint line\par - ROM: 5-90\par - Collateral laxity: none\par - Cruciate laxity: none\par - Popliteal angle (degrees): 45\par - Quad strength: 5/5 [de-identified] : AP pelvis and 4 views of the bilateral knees (weightbearing AP, weightbearing Noyola, weightbearing lateral, and Sunrise) were obtained today, interpreted by me, and reviewed with the patient.\par \par Pelvic alignment: normal\par \par Right hip --\par Alignment: normal\par Arthritis: none\par Deformity: none\par Osteonecrosis: none\par \par Left hip --\par Alignment: normal\par Arthritis: none\par Deformity: none\par Osteonecrosis: none\par \par Right knee --\par Alignment: varus\par Arthritis: tricompartmental bone on bone medial, KL4\par Patellar height: normal\par Patellar tracking: central\par \par Left knee -- TKA in position. All components appear well fixed in reasonable alignment without evidence of mechanical complication. Patella sits at appropriate height and tracks centrally.

## 2021-09-27 NOTE — HISTORY OF PRESENT ILLNESS
[___ yrs] : [unfilled] year(s) ago [7] : a current pain level of 7/10 [Constant] : ~He/She~ states the symptoms seem to be constant [Heat] : relieved by heat [Ice] : relieved by ice [de-identified] : 9/27/21: 67y/o female presenting for evaluation of right knee osteoarthritis. L TKA in 2018 by Dr. Carrillo and has no complaints. Right knee has been receiving serial injections with reasonable relief (most recently Gel-One by Dr. Lopez in March). Currently taking only Tylenol for pain. Has been doing HEP in the form of cycling, core work, and leg lifts. No formal PT. Exercise tolerance about 3-4 blocks without cane. Would like to continue with right knee injections. Incidentally also complains of intermittent right leg numbness occasionally at nighttime with slight associated low back pain. \par \par PMH sig only for HTN. [de-identified] : patient describes pain as radiating and achy

## 2021-09-27 NOTE — DISCUSSION/SUMMARY
[de-identified] : 65y/o female with well-functioning L TKA, right knee osteoarthritis\par - HEP; AAOS packet given\par - Tylenol + meloxicam as needed\par - CSI right knee given today\par - Order HA right knee\par - Pain Mgmt referral for the right leg radiculopathy\par - Follow up as needed for repeat injections. She will be a candidate for R TKA once conservative measures lose efficacy

## 2021-09-27 NOTE — PROCEDURE
[Injection] : Injection [Right] : of the right [Knee Joint] : knee joint [Osteoarthritis] : Osteoarthritis [Patient] : patient [Alcohol] : Alcohol [Betadine] : Betadine [Ethyl Chloride Spray] : ethyl chloride spray was used as a topical anesthetic [Lateral] : lateral [Inferior] : inferior [20] : a 20-gauge [1% Lidocaine___(mL)] : [unfilled] mL of 1% Lidocaine [Triamcinolone 40mg/mL___(mL)] : [unfilled] ~UmL of 40mg/mL triamcinolone [Bandage Applied] : a bandage [Tolerated Well] : The patient tolerated the procedure well [None] : none

## 2021-10-20 ENCOUNTER — APPOINTMENT (OUTPATIENT)
Dept: ORTHOPEDIC SURGERY | Facility: CLINIC | Age: 66
End: 2021-10-20
Payer: COMMERCIAL

## 2021-10-20 PROCEDURE — 20610 DRAIN/INJ JOINT/BURSA W/O US: CPT | Mod: RT

## 2021-10-21 RX ORDER — HYALURONATE SOD, CROSS-LINKED 30 MG/3 ML
30 SYRINGE (ML) INTRAARTICULAR
Qty: 3 | Refills: 0 | Status: ACTIVE | COMMUNITY
Start: 2021-09-27

## 2021-10-22 NOTE — PROCEDURE
[Injection] : Injection [Right] : of the right [Knee Joint] : knee joint [Osteoarthritis] : Osteoarthritis [Patient] : patient [Alcohol] : Alcohol [Betadine] : Betadine [Ethyl Chloride Spray] : ethyl chloride spray was used as a topical anesthetic [Lateral] : lateral [Inferior] : inferior [20] : a 20-gauge [Bandage Applied] : a bandage [Ace Wrap] : an ace wrap [Tolerated Well] : The patient tolerated the procedure well [None] : none [FreeTextEntry8] : Gel-One [de-identified] : GEL-ONE INJECTION\par RIGHT KNEEE JOINT\par LOT#: [id number]\par EXP DATE: 08/01/2023

## 2021-10-22 NOTE — PROCEDURE
[Injection] : Injection [Right] : of the right [Knee Joint] : knee joint [Osteoarthritis] : Osteoarthritis [Patient] : patient [Alcohol] : Alcohol [Betadine] : Betadine [Ethyl Chloride Spray] : ethyl chloride spray was used as a topical anesthetic [Lateral] : lateral [Inferior] : inferior [20] : a 20-gauge [Bandage Applied] : a bandage [Ace Wrap] : an ace wrap [Tolerated Well] : The patient tolerated the procedure well [None] : none [FreeTextEntry8] : Gel-One [de-identified] : GEL-ONE INJECTION\par RIGHT KNEEE JOINT\par LOT#: [id number]\par EXP DATE: 08/01/2023

## 2021-10-27 ENCOUNTER — APPOINTMENT (OUTPATIENT)
Dept: PHYSICAL MEDICINE AND REHAB | Facility: CLINIC | Age: 66
End: 2021-10-27
Payer: COMMERCIAL

## 2021-10-27 VITALS — WEIGHT: 165 LBS | RESPIRATION RATE: 16 BRPM | HEIGHT: 60 IN | OXYGEN SATURATION: 97 % | BODY MASS INDEX: 32.39 KG/M2

## 2021-10-27 DIAGNOSIS — M79.604 PAIN IN RIGHT LEG: ICD-10-CM

## 2021-10-27 PROCEDURE — 99202 OFFICE O/P NEW SF 15 MIN: CPT

## 2021-10-28 PROBLEM — M79.604 PAIN OF RIGHT LOWER EXTREMITY: Status: ACTIVE | Noted: 2021-10-28

## 2021-10-28 NOTE — PHYSICAL EXAM
[FreeTextEntry1] : LUMBAR\par GEN: AAOx3. NAD.\par INSP: Spine alignment midline. No evidence of scoliosis.\par STANCE: Trendelenburg/SLS (-) R (-) L. \par GAIT: (-) Antalgic. Normal reciprocating heel to toe\par SENS: Grossly intact to LT BLE.\par REFLEXES: Symmetric patella, achilles. \par TONE: Normal. No clonus.\par SPECIAL: SLR/Slump (-) R (-) L.   Gaenslen (-) R (-) L.\par                 FADIR (-) R (-) L.          YENY (-) R (-) L.  \par PALP: Midline/Spinous processes (-).   Paraspinals (-) R  (-) L.   \par            QL (-) R (-) L.      SIJ (-) R (-) L.            GTB (-) R (-) L.\par \par LS ROM:                                                              \par Flex               Full. Axial Sx (-). BLE Sx (-)\par Ext                 Full. Axial Sx (-). BLE Sx (-)\par Lat Flex         Full. Axial Sx (-). BLE Sx (-)       \par Rotation         Full. Axial Sx (-). BLE Sx (-) \par Oblique Ext   Full. Axial Sx (-). BLE Sx (-)  \par \par HIP ROM:      RIGHT           LEFT\par Flex             Full. (-)          Full. (-)\par IR                 Full. (-)          Full. (-)\par ER                Full. (-)          Full. (-)\par \par STRENGTH:    RIGHT      LEFT\par Hip Flex            5/5 5/5\par Hip ABd            5/5 5/5\par Knee Ext           5/5 5/5\par Ankle DF           5/5 5/5\par Ankle PF           5/5 5/5\par EHL                   5/5 5/5\par EV                    5/5 5/5\par

## 2021-10-28 NOTE — ASSESSMENT
[FreeTextEntry1] : Impression:\par 1. RLE paresthesias \par \par Plan: The history and physical examination were reviewed. The patients symptoms are unclear at this time however do not seem consistent with spine etiology. Symptoms are non-reproducible on examination, and are not associated with any movement/activity, are only present upon lying down in bed at night and resolve spontaneously with change of position. I have recommended the patient seek Neurology consultation for further evaluation and treatment. The patient was educated on red flag symptoms and was instructed to call the office should the current condition worsen or new symptoms develop. The patient will follow up as needed. The patient expressed verbal understanding and is in agreement with the plan. All of the patient's questions and concerns were addressed during today's visit.\par \par A total of 30 minutes was spent for the duration of this encounter.

## 2021-10-28 NOTE — HISTORY OF PRESENT ILLNESS
[FreeTextEntry1] : Referring Physician: Dr. Ming LAWRENCE MD\par \par Ms. BRENNAN NORRIS is a very pleasant 66 year female who comes in for evaluation of RIGHT LEG pain that has been ongoing for 4-5 months without any specific injury or inciting event. Patient has tried Tylenol which helps. The symptoms are predominantly numbness/tingling with some pain in the right leg, exclusively when lying in bed at night. The patient does not experience any symptoms throughout the day or with any activity. She does have long standing focal lower back pain that comes and goes with weather changes for years which she feels is not associated with the current RLE issue. The patient's RLE symptoms are only present after lying down in bed at night and resolve after changing positions or with occasional Tylenol. The patient is Retired.  The patient feels numbness/tingling but denies any night pain, weakness, or bowel/bladder dysfunction. The patient also denies any night sweats, fever, chills, or weight loss. The patient has no other complaints at this time.

## 2022-02-02 ENCOUNTER — APPOINTMENT (OUTPATIENT)
Dept: ORTHOPEDIC SURGERY | Facility: CLINIC | Age: 67
End: 2022-02-02

## 2022-02-14 ENCOUNTER — APPOINTMENT (OUTPATIENT)
Dept: NEUROLOGY | Facility: CLINIC | Age: 67
End: 2022-02-14
Payer: COMMERCIAL

## 2022-02-14 ENCOUNTER — NON-APPOINTMENT (OUTPATIENT)
Age: 67
End: 2022-02-14

## 2022-02-14 VITALS
OXYGEN SATURATION: 100 % | BODY MASS INDEX: 34.95 KG/M2 | WEIGHT: 178 LBS | HEIGHT: 60 IN | HEART RATE: 125 BPM | DIASTOLIC BLOOD PRESSURE: 83 MMHG | TEMPERATURE: 97.9 F | SYSTOLIC BLOOD PRESSURE: 134 MMHG

## 2022-02-14 DIAGNOSIS — Z78.9 OTHER SPECIFIED HEALTH STATUS: ICD-10-CM

## 2022-02-14 PROCEDURE — 99203 OFFICE O/P NEW LOW 30 MIN: CPT

## 2022-02-14 RX ORDER — MELOXICAM 7.5 MG/1
7.5 TABLET ORAL DAILY
Qty: 60 | Refills: 2 | Status: DISCONTINUED | COMMUNITY
Start: 2021-09-27 | End: 2022-02-14

## 2022-02-14 RX ORDER — MELOXICAM 7.5 MG/1
7.5 TABLET ORAL TWICE DAILY
Qty: 60 | Refills: 0 | Status: DISCONTINUED | COMMUNITY
Start: 2019-12-18 | End: 2022-02-14

## 2022-02-14 RX ORDER — LISINOPRIL AND HYDROCHLOROTHIAZIDE TABLETS 20; 12.5 MG/1; MG/1
20-12.5 TABLET ORAL DAILY
Refills: 0 | Status: ACTIVE | COMMUNITY
Start: 2021-04-19

## 2022-02-14 RX ORDER — LISINOPRIL 2.5 MG/1
2.5 TABLET ORAL
Refills: 0 | Status: DISCONTINUED | COMMUNITY
End: 2022-02-14

## 2022-02-14 NOTE — ASSESSMENT
[FreeTextEntry1] : Intermittent nocturnal right leg numbness.\par \par I do not have a definite explanation for her numbness but I do not suspect radiculopathy or peripheral nerve syndrome and would not recommend any imaging at this time.  She does seem to have adjusted her gait somewhat to protect the right knee and this may be intermittently compressing a sensory nerve -- recommend physical therapy in addition to injections for knee pain.  I advised her to return if the numbness becomes continuous or spreads to any other part of her body, if she has worsening back pain, or if she has any weakness in the legs or elsewhere.\par \par RTC PRN

## 2022-02-14 NOTE — HISTORY OF PRESENT ILLNESS
[FreeTextEntry1] : Reports intermittent numbness of the right leg over the past year.  Only happens at night/while lying down.  Not painful, burning, or tingling, just feels numb.  Has mild intermittent back pain but this does not radiate into her legs.  No leg weakness or gait instability.  No upper extremity numbness.  No neck pain.  Never feels numb during the day or while walking.  No involuntary leg movements, jerking, twitching.  She does have severe pain in the R knee from arthritis, currently getting injections and may need need replacement (she is s/p successful replacement of L knee).

## 2022-02-14 NOTE — PHYSICAL EXAM
[FreeTextEntry1] : Physical Exam\par Constitutional: no apparent distress\par Psychiatric: normal affect, euthymic, alert and oriented x 3\par MSK: negative straight leg raise bilaterally\par \par Neurologic Exam:\par Mental Status: awake and alert, speech fluent and prosodic with no paraphasic errors\par Cranial Nerves: I: deferred; II: pupils equal round and reactive, visual fields full to confrontation, III, IV, VI: extraocular movements full with no nystagmus; V: facial sensation intact and symmetric; VII: facial power symmetric; VIII: hearing intact to finger rub; IX/X: palate elevates symmetrically, no dysarthria; XI: shoulder shrug symmetric; XII: tongue protrudes midline\par Motor: normal bulk and tone, no orbiting or pronator drift, power 5/5 to confrontation throughout including shoulder abduction, elbow flexion and extension, wrist flexion and extension, hip flexion, knee flexion and extension, plantarflexion, dorsiflexion, inversion, eversion \par Sensation: intact to light touch, temperature, vibration in distal lower extremities bilaterally\par Coordination: finger-nose-finger intact bilaterally\par Reflexes: 2+ biceps, triceps, brachioradialis, patella\par Gait: narrow base, normal stance and stride, normal arm swing, normal tandem, negative Romberg\par

## 2022-02-14 NOTE — CONSULT LETTER
[Dear  ___] : Dear  [unfilled], [Consult Letter:] : I had the pleasure of evaluating your patient, [unfilled]. [Please see my note below.] : Please see my note below. [Sincerely,] : Sincerely, [FreeTextEntry3] : Leni Lowe MD [FreeTextEntry2] : Black Torres MD

## 2022-04-24 RX ORDER — HYALURONATE SODIUM 20 MG/2 ML
20 SYRINGE (ML) INTRAARTICULAR
Qty: 1 | Refills: 0 | Status: ACTIVE | OUTPATIENT
Start: 2022-04-24

## 2022-05-24 ENCOUNTER — APPOINTMENT (OUTPATIENT)
Dept: ORTHOPEDIC SURGERY | Facility: CLINIC | Age: 67
End: 2022-05-24
Payer: COMMERCIAL

## 2022-05-24 VITALS — OXYGEN SATURATION: 99 % | DIASTOLIC BLOOD PRESSURE: 87 MMHG | HEART RATE: 102 BPM | SYSTOLIC BLOOD PRESSURE: 143 MMHG

## 2022-05-24 PROCEDURE — 20610 DRAIN/INJ JOINT/BURSA W/O US: CPT | Mod: RT

## 2022-10-12 ENCOUNTER — APPOINTMENT (OUTPATIENT)
Dept: ORTHOPEDIC SURGERY | Facility: CLINIC | Age: 67
End: 2022-10-12

## 2022-11-22 ENCOUNTER — APPOINTMENT (OUTPATIENT)
Dept: ORTHOPEDIC SURGERY | Facility: CLINIC | Age: 67
End: 2022-11-22

## 2022-11-22 ENCOUNTER — RESULT REVIEW (OUTPATIENT)
Age: 67
End: 2022-11-22

## 2022-11-22 ENCOUNTER — OUTPATIENT (OUTPATIENT)
Dept: OUTPATIENT SERVICES | Facility: HOSPITAL | Age: 67
LOS: 1 days | End: 2022-11-22
Payer: COMMERCIAL

## 2022-11-22 VITALS
HEIGHT: 64 IN | OXYGEN SATURATION: 100 % | HEART RATE: 116 BPM | BODY MASS INDEX: 30.39 KG/M2 | TEMPERATURE: 97.9 F | DIASTOLIC BLOOD PRESSURE: 92 MMHG | WEIGHT: 178 LBS | SYSTOLIC BLOOD PRESSURE: 152 MMHG

## 2022-11-22 DIAGNOSIS — Z96.659 PRESENCE OF UNSPECIFIED ARTIFICIAL KNEE JOINT: ICD-10-CM

## 2022-11-22 DIAGNOSIS — M25.561 PAIN IN RIGHT KNEE: ICD-10-CM

## 2022-11-22 DIAGNOSIS — M17.0 BILATERAL PRIMARY OSTEOARTHRITIS OF KNEE: ICD-10-CM

## 2022-11-22 DIAGNOSIS — M25.562 PAIN IN RIGHT KNEE: ICD-10-CM

## 2022-11-22 DIAGNOSIS — Z47.1 AFTERCARE FOLLOWING JOINT REPLACEMENT SURGERY: ICD-10-CM

## 2022-11-22 DIAGNOSIS — Z96.652 AFTERCARE FOLLOWING JOINT REPLACEMENT SURGERY: ICD-10-CM

## 2022-11-22 DIAGNOSIS — Z98.890 OTHER SPECIFIED POSTPROCEDURAL STATES: Chronic | ICD-10-CM

## 2022-11-22 DIAGNOSIS — O00.109 UNSPECIFIED TUBAL PREGNANCY WITHOUT INTRAUTERINE PREGNANCY: Chronic | ICD-10-CM

## 2022-11-22 DIAGNOSIS — K08.499 PARTIAL LOSS OF TEETH DUE TO OTHER SPECIFIED CAUSE, UNSPECIFIED CLASS: Chronic | ICD-10-CM

## 2022-11-22 PROCEDURE — 20610 DRAIN/INJ JOINT/BURSA W/O US: CPT | Mod: RT

## 2022-11-22 PROCEDURE — 99214 OFFICE O/P EST MOD 30 MIN: CPT | Mod: 25

## 2022-11-22 PROCEDURE — 73564 X-RAY EXAM KNEE 4 OR MORE: CPT | Mod: 26,50

## 2022-11-22 PROCEDURE — 73564 X-RAY EXAM KNEE 4 OR MORE: CPT

## 2022-11-22 RX ORDER — HYALURONATE SODIUM 20 MG/2 ML
20 SYRINGE (ML) INTRAARTICULAR
Qty: 3 | Refills: 0 | Status: ACTIVE | OUTPATIENT
Start: 2022-11-22

## 2022-11-22 RX ORDER — MELOXICAM 7.5 MG/1
7.5 TABLET ORAL DAILY
Qty: 30 | Refills: 2 | Status: ACTIVE | COMMUNITY
Start: 2022-11-22 | End: 1900-01-01

## 2022-11-22 NOTE — END OF VISIT
[FreeTextEntry3] : All medical record entries made by the Scribe were at my, Dr. Ming Paez, direction and personally dictated by me on 11/22/2022. I have reviewed the chart and agree that the record accurately reflects my personal performance of the history, physical exam, assessment and plan. I have also personally directed, reviewed, and agreed with the chart.

## 2022-11-22 NOTE — PROCEDURE
[de-identified] : Procedure: Knee joint injection\par Laterality: right\par Indication: Osteoarthritis - discussed with patient\par Skin prep: alcohol and chlorhexidine\par Anesthesia: ethyl chloride spray\par Needle: 20-gauge\par Portal: inferolateral\par Aspiration: none\par Injectate: 2cc of 2% lidocaine, 2cc of 0.5% bupivacaine, and 1cc of 40mg/mL triamcinolone\par Dressing: Band-aid\par Complications: None

## 2022-11-22 NOTE — PHYSICAL EXAM
[de-identified] : General appearance: well nourished and hydrated, pleasant, alert and oriented x 3, cooperative.  \par HEENT: normocephalic, EOM intact, wearing mask, external auditory canal clear.  \par Cardiovascular: no lower leg edema, no varicosities, dorsalis pedis pulses palpable and symmetric.  \par Lymphatics: no palpable lymphadenopathy, no lymphedema.  \par Neurologic: sensation is normal, no muscle weakness in upper or lower extremities, patella tendon reflexes present and symmetric.  \par Dermatologic: skin moist, warm, no rash.  \par Spine: cervical spine with normal lordosis and painless range of motion, thoracic spine with normal kyphosis and painless range of motion, lumbosacral spine with normal lordosis and painless range of motion. \par Gait: normal.  \par \par Left knee:\par - Soft tissue swelling: none\par - Ecchymosis: none\par - Erythema: none\par - Effusion: none, no Baker's cyst\par - Wounds: healed midline incision, benign appearing\par - Alignment: normal\par - Tenderness: none\par - ROM: 0-100\par - Collateral laxity: stable\par - Cruciate laxity: stable\par - Popliteal angle (degrees): 10\par - Quad strength: 5/5\par \par Right knee:\par - Soft tissue swelling: none\par - Ecchymosis: none\par - Erythema: none\par - Effusion: trace, no Baker's cyst\par - Wounds: none\par - Alignment: varus\par - Tenderness: mild medial joint line TTP\par - ROM: 5-90\par - Collateral laxity: mild pseudolaxity to varus\par - Cruciate laxity: none\par - Popliteal angle (degrees): 10\par - Quad strength: 4+/5, effort limited by pain [de-identified] : 4 radiographic views were taken of the bilateral knees. These demonstrate right knee varus alignment, tricompartmental OA most pronounced medially with bone on bone articulation, KL 4. Patella sits at normal height and tracks centrally. Left knee demonstrates a well fixed TKA in unchanged position as compared to previous imaging without evidence of mechanical complication. Patella sits at normal height and tracks centrally.

## 2022-11-22 NOTE — ADDENDUM
[FreeTextEntry1] : Documented by Josephine Jackman acting as a scribe for Dr. Ming Paez on 11/22/2022.

## 2022-11-22 NOTE — DISCUSSION/SUMMARY
[de-identified] : 66 y/o female with a well functioning left TKA and right knee OA. \par - She has been doing well on the right side with episodic injection treatment, so we will continue on today. \par - She will be given a right knee CSI today and we will reorder the MonoVisc for the right knee as well. \par - She will RTC once that has been authorized by insurance. \par - She will also be given a new referral for PT and a knee focused HEP as well as Tylenol and meloxicam as needed. \par - She will f/u as needed for serial injections. Once the injections begin losing efficacy, then I will consider her a candidate for right TKA.

## 2023-01-11 ENCOUNTER — APPOINTMENT (OUTPATIENT)
Dept: ORTHOPEDIC SURGERY | Facility: CLINIC | Age: 68
End: 2023-01-11
Payer: COMMERCIAL

## 2023-01-11 VITALS
HEART RATE: 118 BPM | WEIGHT: 178 LBS | HEIGHT: 64 IN | DIASTOLIC BLOOD PRESSURE: 77 MMHG | BODY MASS INDEX: 30.39 KG/M2 | SYSTOLIC BLOOD PRESSURE: 133 MMHG | OXYGEN SATURATION: 100 %

## 2023-01-11 PROCEDURE — 20610 DRAIN/INJ JOINT/BURSA W/O US: CPT | Mod: RT

## 2023-01-12 NOTE — PROCEDURE
[de-identified] : monovisc injection -  right knee joint \par LOT - [id number]\par EXP - 2025-07-31\par MAN- DEPUY SYNTHES \par NDC - [address]-96\par \par Procedure: Knee joint injection\par Laterality: RIGHT\par Indication: Osteoarthritis - discussed with patient\par Skin prep: alcohol and chlorhexidine\par Anesthesia: ethyl chloride spray\par Needle: 20-gauge\par Portal: inferolateral\par Aspiration: none\par Injectate: Monovisc\par Dressing: Band-aid\par Complications: None\par \par -Followup in 6 months to repeat to serial right knee monovisc injections pending relief.

## 2023-01-17 ENCOUNTER — NON-APPOINTMENT (OUTPATIENT)
Age: 68
End: 2023-01-17

## 2023-03-15 NOTE — PROGRESS NOTE ADULT - PROBLEM/PLAN-4
DISPLAY PLAN FREE TEXT Opioid Pregnancy And Lactation Text: These medications can lead to premature delivery and should be avoided during pregnancy. These medications are also present in breast milk in small amounts.

## 2023-07-26 ENCOUNTER — APPOINTMENT (OUTPATIENT)
Dept: ORTHOPEDIC SURGERY | Facility: CLINIC | Age: 68
End: 2023-07-26
Payer: COMMERCIAL

## 2023-07-26 VITALS
WEIGHT: 165 LBS | HEART RATE: 121 BPM | BODY MASS INDEX: 28.17 KG/M2 | OXYGEN SATURATION: 100 % | SYSTOLIC BLOOD PRESSURE: 136 MMHG | DIASTOLIC BLOOD PRESSURE: 82 MMHG | HEIGHT: 64 IN

## 2023-07-26 PROCEDURE — 20610 DRAIN/INJ JOINT/BURSA W/O US: CPT | Mod: RT

## 2023-07-26 RX ORDER — HYALURONATE SOD, CROSS-LINKED 30 MG/3 ML
30 SYRINGE (ML) INTRAARTICULAR
Qty: 1 | Refills: 0 | Status: ACTIVE | OUTPATIENT
Start: 2023-07-26

## 2023-07-27 NOTE — PROCEDURE
[de-identified] : Procedure: Knee joint injection\par Laterality: RIGHT\par Indication: Osteoarthritis - discussed with patient\par Skin prep: alcohol and chlorhexidine\par Anesthesia: ethyl chloride spray\par Needle: 20-gauge\par Portal: inferolateral\par Aspiration: none\par Injectate: 2cc of 2% lidocaine, 2cc of 0.5% bupivacaine, and 1cc of 40mg/mL triamcinolone\par Dressing: Band-aid\par Complications: None\par \par - Right knee HA authorization submitted. Patient prefers not to repeat MONOVISC due to post inflammatory reaction and would like to have GEL- ONE

## 2023-07-27 NOTE — PROCEDURE
[de-identified] : Procedure: Knee joint injection\par Laterality: RIGHT\par Indication: Osteoarthritis - discussed with patient\par Skin prep: alcohol and chlorhexidine\par Anesthesia: ethyl chloride spray\par Needle: 20-gauge\par Portal: inferolateral\par Aspiration: none\par Injectate: 2cc of 2% lidocaine, 2cc of 0.5% bupivacaine, and 1cc of 40mg/mL triamcinolone\par Dressing: Band-aid\par Complications: None\par \par - Right knee HA authorization submitted. Patient prefers not to repeat MONOVISC due to post inflammatory reaction and would like to have GEL- ONE

## 2023-09-06 ENCOUNTER — APPOINTMENT (OUTPATIENT)
Dept: ORTHOPEDIC SURGERY | Facility: CLINIC | Age: 68
End: 2023-09-06

## 2023-09-27 ENCOUNTER — NON-APPOINTMENT (OUTPATIENT)
Age: 68
End: 2023-09-27

## 2023-09-27 ENCOUNTER — APPOINTMENT (OUTPATIENT)
Dept: ORTHOPEDIC SURGERY | Facility: CLINIC | Age: 68
End: 2023-09-27

## 2023-09-27 VITALS
HEART RATE: 99 BPM | OXYGEN SATURATION: 99 % | DIASTOLIC BLOOD PRESSURE: 82 MMHG | SYSTOLIC BLOOD PRESSURE: 133 MMHG | BODY MASS INDEX: 28.17 KG/M2 | WEIGHT: 165 LBS | HEIGHT: 64 IN

## 2023-09-27 RX ORDER — HYALURONATE SODIUM 20 MG/2 ML
20 SYRINGE (ML) INTRAARTICULAR
Qty: 1 | Refills: 0 | Status: ACTIVE | OUTPATIENT
Start: 2023-09-27

## 2023-10-25 ENCOUNTER — APPOINTMENT (OUTPATIENT)
Dept: ORTHOPEDIC SURGERY | Facility: CLINIC | Age: 68
End: 2023-10-25
Payer: COMMERCIAL

## 2023-10-25 VITALS
SYSTOLIC BLOOD PRESSURE: 124 MMHG | WEIGHT: 165 LBS | DIASTOLIC BLOOD PRESSURE: 77 MMHG | HEIGHT: 64 IN | BODY MASS INDEX: 28.17 KG/M2 | OXYGEN SATURATION: 96 % | HEART RATE: 82 BPM

## 2023-10-25 PROCEDURE — 20610 DRAIN/INJ JOINT/BURSA W/O US: CPT | Mod: RT

## 2024-01-24 ENCOUNTER — APPOINTMENT (OUTPATIENT)
Dept: ORTHOPEDIC SURGERY | Facility: CLINIC | Age: 69
End: 2024-01-24
Payer: COMMERCIAL

## 2024-01-24 VITALS
SYSTOLIC BLOOD PRESSURE: 127 MMHG | BODY MASS INDEX: 28.17 KG/M2 | HEIGHT: 64 IN | OXYGEN SATURATION: 100 % | HEART RATE: 88 BPM | DIASTOLIC BLOOD PRESSURE: 82 MMHG | WEIGHT: 165 LBS

## 2024-01-24 PROCEDURE — 20610 DRAIN/INJ JOINT/BURSA W/O US: CPT | Mod: RT

## 2024-01-24 NOTE — HISTORY OF PRESENT ILLNESS
[de-identified] : 01/24/24: 67 y/o female now following up for right knee osteoarthritis and left total knee replacement. We last saw her 3 months ago and administered a right knee gel injection that she reports was effective at controlling her pain considerably, more so than the Monovisc that she had received previously. She has been continuing to follow up with the home exercise program focused mostly on walking and Paige. The Paige classes she attends 2x per week and overall she is satisfied with her activity level, but  she would be interested in getting back into formal outpatient physical therapy. Overall, she reports that her right knee pain and function remain satisfactory to her with serial injections. While she is aware that she is a candidate for right total knee replacement, she wishes to continue with non-surgical care for the time being.   9/27/23: Patient presented today for right knee HA injection. We had specifically requested Gel-One as she had a previous adverse reaction to Monovisc. It appears that Gel-One was denied by her insurance and Monovisc was approved again. I advised the patient not to use the Monovisc again; we will try again to reauthorize a different HA formulation. Patient was agreeable to the plan. Patient will follow up for an appropriate (non-Monovisc) gel formulation or for repeat CSI in 1 month.  11/22/2022: 66 y/o female f/u for right knee OA and L TKA. She states her left knee is doing well with occasional minor pain. She is 6 months s/p right knee MonoVisc injection which provided her about 5 months of relief. She would like to repeat the MonoVisc injection today. She remains active with HEP and biking.   9/27/21: 67y/o female presenting for evaluation of right knee osteoarthritis. L TKA in 2018 by Dr. Carrillo and has no complaints. Right knee has been receiving serial injections with reasonable relief (most recently Gel-One by Dr. Lopez in March). Currently taking only Tylenol for pain. Has been doing HEP in the form of cycling, core work, and leg lifts. No formal PT. Exercise tolerance about 3-4 blocks without cane. Would like to continue with right knee injections. Incidentally also complains of intermittent right leg numbness occasionally at nighttime with slight associated low back pain.   PMH sig only for HTN.

## 2024-01-24 NOTE — DISCUSSION/SUMMARY
[de-identified] : Imp: We can continue with serial CSI/HA injections for as long as they remain effective.  - Again, CHAMPAGNE should be specifically be gel 1, given her adverse previous reaction to Monovisc.  - She would be due for the next HA in April of 2024  so we will set the next follow up for 3 months with repeat bilateral knee x-rays to be taken at that time.

## 2024-01-24 NOTE — END OF VISIT
[FreeTextEntry3] : All medical record entries made by the Scribe were at my, Dr. Ming Paez, direction and personally dictated by me on 01/24/2024. I have reviewed the chart and agree that the record accurately reflects my personal performance of the history, physical exam, assessment and plan. I have also personally directed, reviewed, and agreed with the chart.

## 2024-01-24 NOTE — DISCUSSION/SUMMARY
[de-identified] : Imp: We can continue with serial CSI/HA injections for as long as they remain effective.  - Again, CHAMPAGNE should be specifically be gel 1, given her adverse previous reaction to Monovisc.  - She would be due for the next HA in April of 2024  so we will set the next follow up for 3 months with repeat bilateral knee x-rays to be taken at that time.

## 2024-01-24 NOTE — PROCEDURE
[de-identified] : Procedure: Knee joint injection Laterality: right Indication: Osteoarthritis - discussed with patient Skin prep: alcohol and chlorhexidine Anesthesia: ethyl chloride spray Needle: 20-gauge Portal: inferolateral Aspiration: none Injectate: 2cc of 2% lidocaine, 2cc of 0.5% bupivacaine, and 1cc of 40mg/mL triamcinolone Dressing: Band-aid Complications: None

## 2024-01-24 NOTE — HISTORY OF PRESENT ILLNESS
[de-identified] : 01/24/24: 69 y/o female now following up for right knee osteoarthritis and left total knee replacement. We last saw her 3 months ago and administered a right knee gel injection that she reports was effective at controlling her pain considerably, more so than the Monovisc that she had received previously. She has been continuing to follow up with the home exercise program focused mostly on walking and Paige. The Paige classes she attends 2x per week and overall she is satisfied with her activity level, but  she would be interested in getting back into formal outpatient physical therapy. Overall, she reports that her right knee pain and function remain satisfactory to her with serial injections. While she is aware that she is a candidate for right total knee replacement, she wishes to continue with non-surgical care for the time being.   9/27/23: Patient presented today for right knee HA injection. We had specifically requested Gel-One as she had a previous adverse reaction to Monovisc. It appears that Gel-One was denied by her insurance and Monovisc was approved again. I advised the patient not to use the Monovisc again; we will try again to reauthorize a different HA formulation. Patient was agreeable to the plan. Patient will follow up for an appropriate (non-Monovisc) gel formulation or for repeat CSI in 1 month.  11/22/2022: 66 y/o female f/u for right knee OA and L TKA. She states her left knee is doing well with occasional minor pain. She is 6 months s/p right knee MonoVisc injection which provided her about 5 months of relief. She would like to repeat the MonoVisc injection today. She remains active with HEP and biking.   9/27/21: 67y/o female presenting for evaluation of right knee osteoarthritis. L TKA in 2018 by Dr. Carrillo and has no complaints. Right knee has been receiving serial injections with reasonable relief (most recently Gel-One by Dr. Lopez in March). Currently taking only Tylenol for pain. Has been doing HEP in the form of cycling, core work, and leg lifts. No formal PT. Exercise tolerance about 3-4 blocks without cane. Would like to continue with right knee injections. Incidentally also complains of intermittent right leg numbness occasionally at nighttime with slight associated low back pain.   PMH sig only for HTN.

## 2024-01-24 NOTE — PROCEDURE
[de-identified] : Procedure: Knee joint injection Laterality: right Indication: Osteoarthritis - discussed with patient Skin prep: alcohol and chlorhexidine Anesthesia: ethyl chloride spray Needle: 20-gauge Portal: inferolateral Aspiration: none Injectate: 2cc of 2% lidocaine, 2cc of 0.5% bupivacaine, and 1cc of 40mg/mL triamcinolone Dressing: Band-aid Complications: None

## 2024-04-24 ENCOUNTER — APPOINTMENT (OUTPATIENT)
Dept: ORTHOPEDIC SURGERY | Facility: CLINIC | Age: 69
End: 2024-04-24
Payer: MEDICARE

## 2024-04-24 ENCOUNTER — RESULT REVIEW (OUTPATIENT)
Age: 69
End: 2024-04-24

## 2024-04-24 ENCOUNTER — OUTPATIENT (OUTPATIENT)
Dept: OUTPATIENT SERVICES | Facility: HOSPITAL | Age: 69
LOS: 1 days | End: 2024-04-24
Payer: COMMERCIAL

## 2024-04-24 VITALS
HEART RATE: 97 BPM | HEIGHT: 60 IN | OXYGEN SATURATION: 100 % | WEIGHT: 155 LBS | BODY MASS INDEX: 30.43 KG/M2 | SYSTOLIC BLOOD PRESSURE: 128 MMHG | DIASTOLIC BLOOD PRESSURE: 83 MMHG

## 2024-04-24 DIAGNOSIS — K08.499 PARTIAL LOSS OF TEETH DUE TO OTHER SPECIFIED CAUSE, UNSPECIFIED CLASS: Chronic | ICD-10-CM

## 2024-04-24 DIAGNOSIS — Z98.890 OTHER SPECIFIED POSTPROCEDURAL STATES: Chronic | ICD-10-CM

## 2024-04-24 DIAGNOSIS — O00.109 UNSPECIFIED TUBAL PREGNANCY WITHOUT INTRAUTERINE PREGNANCY: Chronic | ICD-10-CM

## 2024-04-24 DIAGNOSIS — M17.11 UNILATERAL PRIMARY OSTEOARTHRITIS, RIGHT KNEE: ICD-10-CM

## 2024-04-24 PROCEDURE — 73564 X-RAY EXAM KNEE 4 OR MORE: CPT | Mod: 26,50

## 2024-04-24 PROCEDURE — 73564 X-RAY EXAM KNEE 4 OR MORE: CPT

## 2024-04-24 PROCEDURE — 20610 DRAIN/INJ JOINT/BURSA W/O US: CPT | Mod: RT

## 2024-04-24 NOTE — PROCEDURE
[de-identified] : 04/24/2024: 68 year old female following up for right knee osteoarthritis and left TKA. She presents today to continue serial right knee CSIs.  Procedure: Knee joint injection Laterality: right Indication: Osteoarthritis - discussed with patient Skin prep: alcohol and chlorhexidine Anesthesia: ethyl chloride spray Needle: 20-gauge Portal: inferolateral Aspiration: none Injectate: 2cc of 2% lidocaine, 2cc of 0.5% bupivacaine, and 1cc of 40mg/mL triamcinolone Dressing: Band-aid Complications: None  Imaging: AP pelvis and 4 views of the bilateral knees (weightbearing AP, weightbearing Noyola, weightbearing lateral, and Sunrise) were interpreted by me and reviewed with the patient.  Location of imaging: [facility] Date of exam: 04/24/2024  Right knee --  Alignment: varus with lateral tibial subluxation Arthritis: tricompartmental osteoarthritis, most pronounced medially with bone-on-bone articulation, Kellgren & Owen 4 Patellar height: normal Patellar tracking: central  Left knee --  Stable appearance of her left total knee arthroplasty as compared to prior imaging without evidence of mechanical complication. Patellar height: normal Patellar tracking: central  Impression: 68 year old female with right knee osteoarthritis and left TKA. - We will continue with serial right knee CSI every 3 months as needed. - Offered to reorder right knee HA injections today, but she declined as CSIs have been providing adequate relief. She is aware that she is a candidate for right total knee arthroplasty if injections lose their efficacy.

## 2024-04-24 NOTE — ADDENDUM
[FreeTextEntry1] : I, Remi Sharpe, documented this note as a scribe on behalf of Dr. Ming Paez on 04/24/2024.

## 2024-04-24 NOTE — END OF VISIT
[FreeTextEntry3] : All medical record entries made by the Scribe were at my, Dr. Ming Paez, direction and personally dictated by me on 04/24/2024. I have reviewed the chart and agree that the record accurately reflects my personal performance of the history, physical exam, assessment and plan. I have also personally directed, reviewed, and agreed with the chart.

## 2024-04-24 NOTE — PROCEDURE
[de-identified] : 04/24/2024: 68 year old female following up for right knee osteoarthritis and left TKA. She presents today to continue serial right knee CSIs.  Procedure: Knee joint injection Laterality: right Indication: Osteoarthritis - discussed with patient Skin prep: alcohol and chlorhexidine Anesthesia: ethyl chloride spray Needle: 20-gauge Portal: inferolateral Aspiration: none Injectate: 2cc of 2% lidocaine, 2cc of 0.5% bupivacaine, and 1cc of 40mg/mL triamcinolone Dressing: Band-aid Complications: None  Imaging: AP pelvis and 4 views of the bilateral knees (weightbearing AP, weightbearing Noyola, weightbearing lateral, and Sunrise) were interpreted by me and reviewed with the patient.  Location of imaging: [facility] Date of exam: 04/24/2024  Right knee --  Alignment: varus with lateral tibial subluxation Arthritis: tricompartmental osteoarthritis, most pronounced medially with bone-on-bone articulation, Kellgren & Owen 4 Patellar height: normal Patellar tracking: central  Left knee --  Stable appearance of her left total knee arthroplasty as compared to prior imaging without evidence of mechanical complication. Patellar height: normal Patellar tracking: central  Impression: 68 year old female with right knee osteoarthritis and left TKA. - We will continue with serial right knee CSI every 3 months as needed. - Offered to reorder right knee HA injections today, but she declined as CSIs have been providing adequate relief. She is aware that she is a candidate for right total knee arthroplasty if injections lose their efficacy.

## 2024-07-11 NOTE — PHYSICAL THERAPY INITIAL EVALUATION ADULT - ASSISTIVE DEVICE FOR TRANSFER: SIT/STAND, REHAB EVAL
rolling walker Detail Level: Zone Photo Preface (Leave Blank If You Do Not Want): Photographs were obtained today

## 2024-07-24 ENCOUNTER — APPOINTMENT (OUTPATIENT)
Dept: ORTHOPEDIC SURGERY | Facility: CLINIC | Age: 69
End: 2024-07-24

## 2024-10-02 ENCOUNTER — APPOINTMENT (OUTPATIENT)
Dept: ORTHOPEDIC SURGERY | Facility: CLINIC | Age: 69
End: 2024-10-02
Payer: MEDICARE

## 2024-10-02 VITALS
HEIGHT: 60 IN | BODY MASS INDEX: 28.27 KG/M2 | OXYGEN SATURATION: 100 % | SYSTOLIC BLOOD PRESSURE: 139 MMHG | DIASTOLIC BLOOD PRESSURE: 82 MMHG | HEART RATE: 96 BPM | WEIGHT: 144 LBS

## 2024-10-02 DIAGNOSIS — M17.11 UNILATERAL PRIMARY OSTEOARTHRITIS, RIGHT KNEE: ICD-10-CM

## 2024-10-02 PROCEDURE — 20610 DRAIN/INJ JOINT/BURSA W/O US: CPT | Mod: RT

## 2024-10-02 NOTE — PROCEDURE
[de-identified] :  Procedure: Knee joint injection Laterality:  RIGHT Indication: Osteoarthritis - discussed with patient Skin prep: alcohol and chlorhexidine Anesthesia: ethyl chloride spray Needle: 20-gauge Portal: inferolateral Aspiration: none Injectate: 2cc of 2% lidocaine, 2cc of 0.5% bupivacaine, and 1cc of 40mg/mL triamcinolone Dressing: Band-aid Complications: None  - RTC in 3 months to repeat right knee CSI -New PT script provided

## 2025-01-07 ENCOUNTER — NON-APPOINTMENT (OUTPATIENT)
Age: 70
End: 2025-01-07

## 2025-01-07 ENCOUNTER — APPOINTMENT (OUTPATIENT)
Dept: ORTHOPEDIC SURGERY | Facility: CLINIC | Age: 70
End: 2025-01-07
Payer: MEDICARE

## 2025-01-07 VITALS
DIASTOLIC BLOOD PRESSURE: 83 MMHG | OXYGEN SATURATION: 100 % | WEIGHT: 144 LBS | HEIGHT: 60 IN | HEART RATE: 103 BPM | BODY MASS INDEX: 28.27 KG/M2 | SYSTOLIC BLOOD PRESSURE: 139 MMHG

## 2025-01-07 DIAGNOSIS — M17.11 UNILATERAL PRIMARY OSTEOARTHRITIS, RIGHT KNEE: ICD-10-CM

## 2025-01-07 PROCEDURE — 20610 DRAIN/INJ JOINT/BURSA W/O US: CPT | Mod: RT

## 2025-03-19 ENCOUNTER — APPOINTMENT (OUTPATIENT)
Dept: ORTHOPEDIC SURGERY | Facility: CLINIC | Age: 70
End: 2025-03-19

## 2025-03-21 ENCOUNTER — APPOINTMENT (OUTPATIENT)
Dept: ORTHOPEDIC SURGERY | Facility: CLINIC | Age: 70
End: 2025-03-21

## 2025-04-22 ENCOUNTER — APPOINTMENT (OUTPATIENT)
Dept: ORTHOPEDIC SURGERY | Facility: CLINIC | Age: 70
End: 2025-04-22
Payer: MEDICARE

## 2025-04-22 VITALS
WEIGHT: 144 LBS | BODY MASS INDEX: 28.27 KG/M2 | HEART RATE: 93 BPM | SYSTOLIC BLOOD PRESSURE: 121 MMHG | HEIGHT: 60 IN | OXYGEN SATURATION: 100 % | DIASTOLIC BLOOD PRESSURE: 76 MMHG

## 2025-04-22 DIAGNOSIS — M17.11 UNILATERAL PRIMARY OSTEOARTHRITIS, RIGHT KNEE: ICD-10-CM

## 2025-04-22 PROCEDURE — 20610 DRAIN/INJ JOINT/BURSA W/O US: CPT | Mod: RT

## 2025-07-22 ENCOUNTER — APPOINTMENT (OUTPATIENT)
Dept: ORTHOPEDIC SURGERY | Facility: CLINIC | Age: 70
End: 2025-07-22
Payer: MEDICARE

## 2025-07-22 ENCOUNTER — OUTPATIENT (OUTPATIENT)
Dept: OUTPATIENT SERVICES | Facility: HOSPITAL | Age: 70
LOS: 1 days | End: 2025-07-22
Payer: MEDICARE

## 2025-07-22 ENCOUNTER — RESULT REVIEW (OUTPATIENT)
Age: 70
End: 2025-07-22

## 2025-07-22 DIAGNOSIS — Z98.890 OTHER SPECIFIED POSTPROCEDURAL STATES: Chronic | ICD-10-CM

## 2025-07-22 DIAGNOSIS — K08.499 PARTIAL LOSS OF TEETH DUE TO OTHER SPECIFIED CAUSE, UNSPECIFIED CLASS: Chronic | ICD-10-CM

## 2025-07-22 DIAGNOSIS — M17.11 UNILATERAL PRIMARY OSTEOARTHRITIS, RIGHT KNEE: ICD-10-CM

## 2025-07-22 DIAGNOSIS — O00.109 UNSPECIFIED TUBAL PREGNANCY WITHOUT INTRAUTERINE PREGNANCY: Chronic | ICD-10-CM

## 2025-07-22 PROCEDURE — 20610 DRAIN/INJ JOINT/BURSA W/O US: CPT | Mod: RT

## 2025-07-22 PROCEDURE — 73564 X-RAY EXAM KNEE 4 OR MORE: CPT | Mod: 26,50
